# Patient Record
Sex: FEMALE | Race: WHITE | HISPANIC OR LATINO | Employment: OTHER | URBAN - METROPOLITAN AREA
[De-identification: names, ages, dates, MRNs, and addresses within clinical notes are randomized per-mention and may not be internally consistent; named-entity substitution may affect disease eponyms.]

---

## 2020-01-31 ENCOUNTER — TELEPHONE (OUTPATIENT)
Dept: SCHEDULING | Facility: IMAGING CENTER | Age: 55
End: 2020-01-31

## 2020-01-31 ENCOUNTER — OFFICE VISIT (OUTPATIENT)
Dept: MEDICAL GROUP | Facility: PHYSICIAN GROUP | Age: 55
End: 2020-01-31
Payer: COMMERCIAL

## 2020-01-31 VITALS
DIASTOLIC BLOOD PRESSURE: 80 MMHG | BODY MASS INDEX: 33.14 KG/M2 | TEMPERATURE: 99.2 F | WEIGHT: 206.2 LBS | HEIGHT: 66 IN | OXYGEN SATURATION: 96 % | RESPIRATION RATE: 20 BRPM | HEART RATE: 92 BPM | SYSTOLIC BLOOD PRESSURE: 136 MMHG

## 2020-01-31 DIAGNOSIS — Z00.00 ROUTINE HEALTH MAINTENANCE: ICD-10-CM

## 2020-01-31 DIAGNOSIS — G44.209 ACUTE NON INTRACTABLE TENSION-TYPE HEADACHE: Primary | ICD-10-CM

## 2020-01-31 PROBLEM — R51.9 HEADACHE: Status: ACTIVE | Noted: 2020-01-31

## 2020-01-31 PROCEDURE — 99203 OFFICE O/P NEW LOW 30 MIN: CPT | Performed by: FAMILY MEDICINE

## 2020-01-31 SDOH — HEALTH STABILITY: MENTAL HEALTH: HOW OFTEN DO YOU HAVE A DRINK CONTAINING ALCOHOL?: MONTHLY OR LESS

## 2020-01-31 SDOH — HEALTH STABILITY: MENTAL HEALTH: HOW MANY STANDARD DRINKS CONTAINING ALCOHOL DO YOU HAVE ON A TYPICAL DAY?: 1 OR 2

## 2020-01-31 SDOH — HEALTH STABILITY: MENTAL HEALTH: HOW OFTEN DO YOU HAVE 6 OR MORE DRINKS ON ONE OCCASION?: NEVER

## 2020-01-31 ASSESSMENT — PATIENT HEALTH QUESTIONNAIRE - PHQ9: CLINICAL INTERPRETATION OF PHQ2 SCORE: 0

## 2020-01-31 NOTE — LETTER
[facility]  Yarelis Green M.D.  [address]  [place] [address]  Fax: [phone]   Authorization for Release/Disclosure of   Protected Health Information   Name: SHERON ESCOBAR : 1965 SSN: [id number]   Address: [facility] [place] [address] Phone:    [phone] (home)    I authorize the entity listed below to release/disclose the PHI below to:   [facility]/Yarelis Green M.D. and Yarelis Green M.D.   Provider or Entity Name:  Shahid   Address   City, [facility], [facility] Phone:      Fax:     Reason for request: continuity of care   Information to be released:    [  ] LAST COLONOSCOPY,  including any PATH REPORT and follow-up  [  ] LAST FIT/COLOGUARD RESULT [  ] LAST DEXA  [  ] LAST MAMMOGRAM  [  ] LAST PAP  [  ] LAST LABS [  ] RETINA EXAM REPORT  [  ] IMMUNIZATION RECORDS  [xxx ] Release all info      [  ] Check here and initial the line next to each item to release ALL health information INCLUDING  _____ Care and treatment for drug and / or alcohol abuse  _____ HIV testing, infection status, or AIDS  _____ Genetic Testing    DATES OF SERVICE OR TIME PERIOD TO BE DISCLOSED: _____________  I understand and acknowledge that:  * This Authorization may be revoked at any time by you in writing, except if your health information has already been used or disclosed.  * Your health information that will be used or disclosed as a result of you signing this authorization could be re-disclosed by the recipient. If this occurs, your re-disclosed health information may no longer be protected by State or Federal laws.  * You may refuse to sign this Authorization. Your refusal will not affect your ability to obtain treatment.  * This Authorization becomes effective upon signing and will  on (date) __________.      If no date is indicated, this Authorization will  one (1) year from the signature date.    Name: Sheron Escobar    Signature:   Date:     2020            PLEASE FAX REQUESTED RECORDS BACK TO: [phone]

## 2020-01-31 NOTE — ASSESSMENT & PLAN NOTE
This is a new condition.  Onset: last night  Location: bitemporal  Quality: throbbing, squeezing band  Duration: constant, but improved  Severity: 10/10, now 2/10  Associated symptoms: +nausea/vomiting, +photophobia, no phonophobia, no dizziness, no aura  Home treatments: tylenol (vomited first dose), repeated twice    No change in diet, activities.

## 2020-01-31 NOTE — PROGRESS NOTES
Subjective:     CC:  Diagnoses of Acute non intractable tension-type headache and Routine health maintenance were pertinent to this visit.    HISTORY OF THE PRESENT ILLNESS: Patient is a 54 y.o. female. This pleasant patient is here today to establish care and discuss headache. Her prior PCP was with [facility].    Headache  This is a new condition.  Onset: last night  Location: bitemporal  Quality: throbbing, squeezing band  Duration: constant, but improved  Severity: 10/10, now 2/10  Associated symptoms: +nausea/vomiting, +photophobia, no phonophobia, no dizziness, no aura  Home treatments: tylenol (vomited first dose), repeated twice    No change in diet, activities.      Allergies: Aspirin    No current Epic-ordered outpatient medications on file.     No current Epic-ordered facility-administered medications on file.        No past medical history on file.    Past Surgical History:   Procedure Laterality Date   • OVARIAN CYSTECTOMY  1993       Social History     Tobacco Use   • Smoking status: Never Smoker   • Smokeless tobacco: Never Used   Substance Use Topics   • Alcohol use: Yes     Frequency: Monthly or less     Drinks per session: 1 or 2     Binge frequency: Never     Comment: 1-2 drinks per month   • Drug use: Never       Social History     Patient does not qualify to have social determinant information on file (likely too young).   Social History Narrative   • Not on file       Family History   Problem Relation Age of Onset   • Cancer Mother         uterine   • Heart Disease Mother    • Cancer Father         lymphoma   • Diabetes Maternal great-grandmother        Health Maintenance:   Requesting records    ROS:   Gen: no fevers/chills  Eyes: no changes in vision  ENT: + sore throat - vomitied last night  Pulm: no sob  CV: no chest pain  GI: no diarrhea  : no dysuria  MSk: no myalgias  Skin: no rash  Neuro: + numbness/tingling - both hands, 1 month, mainly at night      Objective:     Exam: BP  "136/80 (BP Location: Left arm, Patient Position: Sitting, BP Cuff Size: Adult)   Pulse 92   Temp 37.3 °C (99.2 °F) (Temporal)   Resp 20   Ht 1.676 m (5' 6\")   Wt 93.5 kg (206 lb 3.2 oz)   SpO2 96%  Body mass index is 33.28 kg/m².    General: Normal appearing. No distress.  HEENT: Normocephalic. Eyes conjunctiva clear lids without ptosis, pupils equal and reactive to light accommodation, ears normal shape and contour, canals are clear bilaterally, tympanic membranes are benign, oropharynx is without erythema, edema or exudates.   Neck: Supple without JVD. Thyroid is not enlarged.  Pulmonary: Clear to ausculation.  Normal effort. No rales, ronchi, or wheezing.  Cardiovascular: Regular rate and rhythm without murmur. Carotid and radial pulses are intact and equal bilaterally.  Abdomen: Soft, nontender, nondistended. Normal bowel sounds. Liver and spleen are not palpable  Neurologic: Grossly nonfocal  Lymph: No cervical or supraclavicular lymph nodes are palpable  Skin: Warm and dry.  No obvious lesions.  Musculoskeletal: Normal gait. No extremity cyanosis, clubbing, or edema.  Psych: Normal mood and affect. Alert and oriented x3. Judgment and insight is normal.    Assessment & Plan:   54 y.o. female with the following -    1. Acute non intractable tension-type headache  This is a new condition.  Last night she developed a sudden onset, severe bitemporal headache that was throbbing and in a squeezing band.  It was a 10/10 severity last night and currently is a 2/10.  There was associated nausea and vomiting and photophobia.  No phonophobia, dizziness, or aura.  Since it is improving and it sounds like a tension headache she will continue to use Tylenol or NSAIDs as needed to help with the pain.    2. Routine health maintenance  She is due for yearly labs.  - Comp Metabolic Panel; Future  - Lipid Profile; Future      Return in about 4 weeks (around 2/28/2020) for f/u headache.    Please note that this dictation was " created using voice recognition software. I have made every reasonable attempt to correct obvious errors, but I expect that there are errors of grammar and possibly content that I did not discover before finalizing the note.

## 2020-02-01 ENCOUNTER — HOSPITAL ENCOUNTER (OUTPATIENT)
Dept: LAB | Facility: MEDICAL CENTER | Age: 55
End: 2020-02-01
Attending: FAMILY MEDICINE
Payer: COMMERCIAL

## 2020-02-01 DIAGNOSIS — Z00.00 ROUTINE HEALTH MAINTENANCE: ICD-10-CM

## 2020-02-01 LAB
ALBUMIN SERPL BCP-MCNC: 4.5 G/DL (ref 3.2–4.9)
ALBUMIN/GLOB SERPL: 1.3 G/DL
ALP SERPL-CCNC: 71 U/L (ref 30–99)
ALT SERPL-CCNC: 13 U/L (ref 2–50)
ANION GAP SERPL CALC-SCNC: 10 MMOL/L (ref 0–11.9)
AST SERPL-CCNC: 16 U/L (ref 12–45)
BILIRUB SERPL-MCNC: 0.5 MG/DL (ref 0.1–1.5)
BUN SERPL-MCNC: 14 MG/DL (ref 8–22)
CALCIUM SERPL-MCNC: 10.1 MG/DL (ref 8.5–10.5)
CHLORIDE SERPL-SCNC: 105 MMOL/L (ref 96–112)
CHOLEST SERPL-MCNC: 227 MG/DL (ref 100–199)
CO2 SERPL-SCNC: 26 MMOL/L (ref 20–33)
CREAT SERPL-MCNC: 0.81 MG/DL (ref 0.5–1.4)
GLOBULIN SER CALC-MCNC: 3.5 G/DL (ref 1.9–3.5)
GLUCOSE SERPL-MCNC: 85 MG/DL (ref 65–99)
HDLC SERPL-MCNC: 58 MG/DL
LDLC SERPL CALC-MCNC: 128 MG/DL
POTASSIUM SERPL-SCNC: 4.2 MMOL/L (ref 3.6–5.5)
PROT SERPL-MCNC: 8 G/DL (ref 6–8.2)
SODIUM SERPL-SCNC: 141 MMOL/L (ref 135–145)
TRIGL SERPL-MCNC: 205 MG/DL (ref 0–149)

## 2020-02-01 PROCEDURE — 80061 LIPID PANEL: CPT

## 2020-02-01 PROCEDURE — 80053 COMPREHEN METABOLIC PANEL: CPT

## 2020-02-01 PROCEDURE — 36415 COLL VENOUS BLD VENIPUNCTURE: CPT

## 2020-06-26 ENCOUNTER — OFFICE VISIT (OUTPATIENT)
Dept: MEDICAL GROUP | Facility: PHYSICIAN GROUP | Age: 55
End: 2020-06-26
Payer: COMMERCIAL

## 2020-06-26 VITALS
HEART RATE: 74 BPM | OXYGEN SATURATION: 96 % | HEIGHT: 66 IN | SYSTOLIC BLOOD PRESSURE: 106 MMHG | TEMPERATURE: 97.3 F | DIASTOLIC BLOOD PRESSURE: 68 MMHG | RESPIRATION RATE: 12 BRPM | WEIGHT: 211.4 LBS | BODY MASS INDEX: 33.97 KG/M2

## 2020-06-26 DIAGNOSIS — R52 TENDERNESS: ICD-10-CM

## 2020-06-26 PROCEDURE — 99214 OFFICE O/P EST MOD 30 MIN: CPT | Performed by: FAMILY MEDICINE

## 2020-06-26 NOTE — ASSESSMENT & PLAN NOTE
This is a new condition.  Onset: 1 month  Location: right axilla - inferior aspect  Duration: intermittent - but more frequent recently  Quality: tenderness - dull ache  Modifying factors: no triggers, worse with direct palpation  Precipitating trauma: none  Associated symptoms: no lumps, no pleuritic pain  Home treatments: none    She reports mammogram in 2/2019 was normal.

## 2020-06-26 NOTE — PROGRESS NOTES
"Subjective:     CC: tenderness in right axilla    HPI:   Sheron presents today with     Tenderness  This is a new condition.  Onset: 1 month  Location: right axilla - inferior aspect  Duration: intermittent - but more frequent recently  Quality: tenderness - dull ache  Modifying factors: no triggers, worse with direct palpation  Precipitating trauma: none  Associated symptoms: no lumps, no pleuritic pain  Home treatments: none    She reports mammogram in 2/2019 was normal.         No past medical history on file.    Social History     Tobacco Use   • Smoking status: Never Smoker   • Smokeless tobacco: Never Used   Substance Use Topics   • Alcohol use: Yes     Frequency: Monthly or less     Drinks per session: 1 or 2     Binge frequency: Never     Comment: 1-2 drinks per month   • Drug use: Never       Current Outpatient Medications Ordered in Epic   Medication Sig Dispense Refill   • albuterol 108 (90 Base) MCG/ACT Aero Soln inhalation aerosol Inhale 2 Puffs by mouth every four hours as needed for Shortness of Breath. 1 Inhaler 2     No current Epic-ordered facility-administered medications on file.        Allergies:  Aspirin    Health Maintenance: Requesting records    ROS:  Gen: no fevers/chills  Pulm: no sob  CV: no chest pain    Objective:     Exam:  /68 (BP Location: Right arm, Patient Position: Sitting, BP Cuff Size: Large adult)   Pulse 74   Temp 36.3 °C (97.3 °F) (Temporal)   Resp 12   Ht 1.676 m (5' 6\")   Wt 95.9 kg (211 lb 6.4 oz)   SpO2 96%   BMI 34.12 kg/m²  Body mass index is 34.12 kg/m².    Gen: Alert and oriented, No apparent distress.  Neck: Neck is supple without lymphadenopathy.  Lungs: Normal effort, CTA bilaterally, no wheezes, rhonchi, or rales  CV: Regular rate and rhythm. No murmurs, rubs, or gallops.  Ext: No clubbing, cyanosis, edema. +TTP just inferior to right axilla. No lumps or pain with nearby muscle activation.    Assessment & Plan:     55 y.o. female with the following - "     1. Tenderness  This is a new condition.  For last month she has had intermittent pain in the inferior aspect of her right axilla.  Has been occurring more frequent recently.  It is a dull ache and there is no triggers she can isolate but it does feel worse when she directly presses on the location.  No precipitating trauma she can recall.  There is no affiliated lungs, pleuritic pain, breast lump, nipple discharge.  She did have her last mammogram in February, 2019 which she reports was normal.  On exam she is tender to palpation just inferior to the right axilla.  I do not palpate any lumps on exam and I suspect it is medius muscle strain.  However, she is worried and would like to get an ultrasound to evaluate the area further, which has been ordered.  - US-ABDOMEN LTD (SOFT TISSUE); Future  -Conservative management discussed    Return if symptoms worsen or fail to improve.    Please note that this dictation was created using voice recognition software. I have made every reasonable attempt to correct obvious errors, but I expect that there are errors of grammar and possibly content that I did not discover before finalizing the note.

## 2020-06-27 ENCOUNTER — HOSPITAL ENCOUNTER (OUTPATIENT)
Dept: RADIOLOGY | Facility: MEDICAL CENTER | Age: 55
End: 2020-06-27
Attending: FAMILY MEDICINE
Payer: COMMERCIAL

## 2020-06-27 DIAGNOSIS — R52 TENDERNESS: ICD-10-CM

## 2020-06-27 PROCEDURE — 76604 US EXAM CHEST: CPT

## 2020-12-01 ENCOUNTER — HOSPITAL ENCOUNTER (OUTPATIENT)
Facility: MEDICAL CENTER | Age: 55
End: 2020-12-01
Attending: NURSE PRACTITIONER
Payer: COMMERCIAL

## 2020-12-01 ENCOUNTER — OFFICE VISIT (OUTPATIENT)
Dept: URGENT CARE | Facility: PHYSICIAN GROUP | Age: 55
End: 2020-12-01
Payer: COMMERCIAL

## 2020-12-01 ENCOUNTER — HOSPITAL ENCOUNTER (OUTPATIENT)
Dept: RADIOLOGY | Facility: MEDICAL CENTER | Age: 55
End: 2020-12-01
Attending: NURSE PRACTITIONER
Payer: COMMERCIAL

## 2020-12-01 VITALS
HEART RATE: 109 BPM | TEMPERATURE: 101 F | RESPIRATION RATE: 16 BRPM | SYSTOLIC BLOOD PRESSURE: 128 MMHG | OXYGEN SATURATION: 95 % | BODY MASS INDEX: 34.07 KG/M2 | DIASTOLIC BLOOD PRESSURE: 70 MMHG | WEIGHT: 212 LBS | HEIGHT: 66 IN

## 2020-12-01 DIAGNOSIS — R50.9 FEVER, UNSPECIFIED FEVER CAUSE: ICD-10-CM

## 2020-12-01 DIAGNOSIS — Z20.822 SUSPECTED COVID-19 VIRUS INFECTION: ICD-10-CM

## 2020-12-01 DIAGNOSIS — J18.9 PNEUMONIA OF RIGHT UPPER LOBE DUE TO INFECTIOUS ORGANISM: ICD-10-CM

## 2020-12-01 DIAGNOSIS — J18.9 PNEUMONIA OF BOTH LOWER LOBES DUE TO INFECTIOUS ORGANISM: ICD-10-CM

## 2020-12-01 PROCEDURE — 71046 X-RAY EXAM CHEST 2 VIEWS: CPT

## 2020-12-01 PROCEDURE — 99214 OFFICE O/P EST MOD 30 MIN: CPT | Mod: CS | Performed by: NURSE PRACTITIONER

## 2020-12-01 PROCEDURE — U0003 INFECTIOUS AGENT DETECTION BY NUCLEIC ACID (DNA OR RNA); SEVERE ACUTE RESPIRATORY SYNDROME CORONAVIRUS 2 (SARS-COV-2) (CORONAVIRUS DISEASE [COVID-19]), AMPLIFIED PROBE TECHNIQUE, MAKING USE OF HIGH THROUGHPUT TECHNOLOGIES AS DESCRIBED BY CMS-2020-01-R: HCPCS

## 2020-12-01 RX ORDER — ACETAMINOPHEN 500 MG
500 TABLET ORAL ONCE
Status: COMPLETED | OUTPATIENT
Start: 2020-12-01 | End: 2020-12-01

## 2020-12-01 RX ORDER — DEXAMETHASONE 6 MG/1
6 TABLET ORAL DAILY
Qty: 5 TAB | Refills: 0 | Status: SHIPPED | OUTPATIENT
Start: 2020-12-01 | End: 2020-12-06

## 2020-12-01 RX ORDER — DOXYCYCLINE HYCLATE 100 MG
100 TABLET ORAL 2 TIMES DAILY
Qty: 14 TAB | Refills: 0 | Status: SHIPPED | OUTPATIENT
Start: 2020-12-01 | End: 2020-12-08

## 2020-12-01 RX ADMIN — Medication 500 MG: at 11:28

## 2020-12-01 ASSESSMENT — ENCOUNTER SYMPTOMS
WEAKNESS: 1
ABDOMINAL PAIN: 0
SORE THROAT: 0
RHINORRHEA: 1
NAUSEA: 0
EYE REDNESS: 0
FEVER: 1
SHORTNESS OF BREATH: 0
WHEEZING: 1
MYALGIAS: 1
DIZZINESS: 0
HEMOPTYSIS: 0
HEADACHES: 1
EYE DISCHARGE: 0
SINUS PRESSURE: 0
COUGH: 1
SPUTUM PRODUCTION: 0

## 2020-12-01 NOTE — PROGRESS NOTES
Subjective:     Sheron Le is a 55 y.o. female who presents for Headache (chills, fever, fatigue, body aches, wheezing,cough, congestion x10)      Headache   Associated symptoms include coughing, a fever, muscle aches, rhinorrhea and weakness. Pertinent negatives include no abdominal pain, dizziness, ear pain, eye redness, nausea, sinus pressure or sore throat. Associated symptoms comments: diarrhea. She has tried acetaminophen for the symptoms.     Pt presents for evaluation of a new problem. Sheron comes to the clinic today with complaints of illness X 10 days. Her symptoms started out with congestion/productive cough  for 4 days and then progressively worsened to chills, back pain, fevers (started 3 days ago), severe fatigue. She does have a history of asthma. She has not felt the need to use her albuterol inhaler. She has used Tylenol and heat pads for her symptoms. This has provided mild relief. She denies any known ill contacts.     Review of Systems   Constitutional: Positive for fever and malaise/fatigue.   HENT: Positive for congestion and rhinorrhea. Negative for ear pain, sinus pressure and sore throat.    Eyes: Negative for discharge and redness.   Respiratory: Positive for cough and wheezing. Negative for hemoptysis, sputum production and shortness of breath.    Gastrointestinal: Negative for abdominal pain and nausea.   Musculoskeletal: Positive for myalgias.   Skin: Negative for rash.   Neurological: Positive for weakness and headaches. Negative for dizziness.   Endo/Heme/Allergies: Negative for environmental allergies.   All other systems reviewed and are negative.      PMH: No past medical history on file.  ALLERGIES:   Allergies   Allergen Reactions   • Aspirin Swelling     Face swelling     SURGHX:   Past Surgical History:   Procedure Laterality Date   • OVARIAN CYSTECTOMY  1993     SOCHX:   Social History     Socioeconomic History   • Marital status:      Spouse name: Not on file   •  "Number of children: Not on file   • Years of education: Not on file   • Highest education level: Not on file   Occupational History   • Not on file   Social Needs   • Financial resource strain: Not on file   • Food insecurity     Worry: Not on file     Inability: Not on file   • Transportation needs     Medical: Not on file     Non-medical: Not on file   Tobacco Use   • Smoking status: Never Smoker   • Smokeless tobacco: Never Used   Substance and Sexual Activity   • Alcohol use: Yes     Frequency: Monthly or less     Drinks per session: 1 or 2     Binge frequency: Never     Comment: 1-2 drinks per month   • Drug use: Never   • Sexual activity: Yes     Partners: Male     Birth control/protection: Post-Menopausal, None   Lifestyle   • Physical activity     Days per week: Not on file     Minutes per session: Not on file   • Stress: Not on file   Relationships   • Social connections     Talks on phone: Not on file     Gets together: Not on file     Attends Cheondo[employer] service: Not on file     Active member of club or organization: Not on file     Attends meetings of clubs or organizations: Not on file     Relationship status: Not on file   • Intimate partner violence     Fear of current or ex partner: Not on file     Emotionally abused: Not on file     Physically abused: Not on file     Forced sexual activity: Not on file   Other Topics Concern   • Not on file   Social History Narrative   • Not on file     FH:   Family History   Problem Relation Age of Onset   • Cancer Mother         uterine   • Heart Disease Mother    • Cancer Father         lymphoma   • Diabetes Maternal great-grandmother          Objective:   /70 (BP Location: Right arm, Patient Position: Sitting, BP Cuff Size: Adult)   Pulse (!) 109   Temp (!) 38.3 °C (101 °F) (Temporal)   Resp 16   Ht 1.676 m (5' 6\")   Wt 96.2 kg (212 lb)   SpO2 95%   BMI 34.22 kg/m²     Physical Exam  Vitals signs and nursing note reviewed.   Constitutional:       " General: She is not in acute distress.     Appearance: Normal appearance. She is normal weight. She is ill-appearing. She is not toxic-appearing.   HENT:      Head: Normocephalic and atraumatic.      Right Ear: Tympanic membrane, ear canal and external ear normal. There is no impacted cerumen.      Left Ear: Tympanic membrane, ear canal and external ear normal. There is no impacted cerumen.      Nose: Nose normal. No congestion or rhinorrhea.      Mouth/Throat:      Mouth: Mucous membranes are moist.      Pharynx: No oropharyngeal exudate or posterior oropharyngeal erythema.   Eyes:      General:         Right eye: No discharge.         Left eye: No discharge.      Extraocular Movements: Extraocular movements intact.      Pupils: Pupils are equal, round, and reactive to light.   Neck:      Musculoskeletal: Normal range of motion and neck supple. Muscular tenderness present. No neck rigidity.   Cardiovascular:      Rate and Rhythm: Regular rhythm. Tachycardia present.      Heart sounds: Normal heart sounds. No murmur.   Pulmonary:      Effort: Pulmonary effort is normal. No respiratory distress.      Breath sounds: Normal breath sounds. No stridor. No wheezing, rhonchi or rales.      Comments: Breathing is labored  Chest:      Chest wall: No tenderness.   Abdominal:      General: Abdomen is flat. Bowel sounds are normal. There is no distension.      Palpations: Abdomen is soft.      Tenderness: There is no abdominal tenderness. There is no right CVA tenderness, left CVA tenderness or guarding.   Musculoskeletal: Normal range of motion.   Lymphadenopathy:      Cervical: Cervical adenopathy present.   Skin:     General: Skin is warm and dry.      Capillary Refill: Capillary refill takes less than 2 seconds.   Neurological:      General: No focal deficit present.      Mental Status: She is alert and oriented to person, place, and time. Mental status is at baseline.   Psychiatric:         Mood and Affect: Mood normal.          Behavior: Behavior normal.         Thought Content: Thought content normal.         Judgment: Judgment normal.       DX chest: IMPRESSION:     1.  Multifocal peripheral airspace process consistent with pneumonia and most consistent with Covid pneumonia  Assessment/Plan:   Assessment    1. Fever, unspecified fever cause  acetaminophen (TYLENOL) tablet 500 mg    DX-CHEST-2 VIEWS   2. Suspected COVID-19 virus infection  DX-CHEST-2 VIEWS    COVID/SARS COV-2 PCR   CXR reviewed by myself and I agree with radiologist interpretation.   Pt was tested for COVID today. It is possible her test will be negative as she is 10 days into her illness.  I will call with results. Upon entering the room PPE was worn throughout the duration of his visit for both provider and patient. Mask of patient briefly removed for examination of oropharynx. PT was educated to remain in self isolation for at least 10 days following the onset of symptoms and to not return to work until symptoms have resolved for three days without the use of symptom altering medication.     We discussed supportive measures including humidifier, warm salt water gargles, over-the-counter Cepacol throat lozenges, rest  and increased fluids. Pt was encouraged to seek treatment back in the ER or urgent care for worsening symptoms,  fever greater than 100.5, wheezes or shortness of breath.    AVS handout given and reviewed with patient. Pt educated on red flags and when to seek treatment back in ER or UC.

## 2020-12-02 DIAGNOSIS — Z20.822 SUSPECTED COVID-19 VIRUS INFECTION: ICD-10-CM

## 2020-12-02 LAB — COVID ORDER STATUS COVID19: NORMAL

## 2020-12-03 LAB
SARS-COV-2 RNA RESP QL NAA+PROBE: DETECTED
SPECIMEN SOURCE: ABNORMAL

## 2020-12-04 ENCOUNTER — TELEPHONE (OUTPATIENT)
Dept: URGENT CARE | Facility: PHYSICIAN GROUP | Age: 55
End: 2020-12-04

## 2020-12-11 ENCOUNTER — APPOINTMENT (OUTPATIENT)
Dept: RADIOLOGY | Facility: IMAGING CENTER | Age: 55
End: 2020-12-11
Attending: NURSE PRACTITIONER
Payer: COMMERCIAL

## 2020-12-11 DIAGNOSIS — J12.82 PNEUMONIA DUE TO COVID-19 VIRUS: ICD-10-CM

## 2020-12-11 DIAGNOSIS — U07.1 PNEUMONIA DUE TO COVID-19 VIRUS: ICD-10-CM

## 2020-12-12 ENCOUNTER — APPOINTMENT (OUTPATIENT)
Dept: RADIOLOGY | Facility: IMAGING CENTER | Age: 55
End: 2020-12-12
Attending: NURSE PRACTITIONER
Payer: COMMERCIAL

## 2020-12-18 ENCOUNTER — NURSE TRIAGE (OUTPATIENT)
Dept: HEALTH INFORMATION MANAGEMENT | Facility: OTHER | Age: 55
End: 2020-12-18

## 2020-12-18 NOTE — TELEPHONE ENCOUNTER
Pt calls with questions regarding Covid and contagiousness. Pt and spouse COVID +, 12/03/2020 with symptom onset of earlier date. Pt has had resolution of symptoms. States spouse still using nebulizer for cough (spouse is a smoker). Provided with CDC guidelines for isolation, symptoms. All questions answered.       1. Caller Name: Sheron Le                 Call Back Number:[phone]  [facility] PCP or Specialty Provider: Yes Yarelis Green        2.  Has the patient previously tested positive for COVID-19? Yes, 12/03/2020    Was the patient hospitalized due to COVID-19 or are they being scheduled for PFT? No         Has it been at least 14 days since date of symptom onset or positive test? Yes    Disposition: Confirmed patient appointment. Advised patient to call [phone] if anything changes prior to scheduled appointment.    Pt not interested in scheduling appointment at this time. Has virtual [facility] on 12/23/2020 with PCP. Just had general questions about Covid and infectious time frame.    Reason for Disposition  • Information only question and nurse able to answer  • COVID-19, questions about    Additional Information  • Negative: Nursing judgment  • Negative: Nursing judgment  • Negative: Nursing judgment  • Negative: Nursing judgment  • Negative: MILD difficulty breathing (e.g., minimal/no SOB at rest, SOB with walking, pulse <100)  • Negative: Chest pain  • Negative: Patient sounds very sick or weak to the triager  • Negative: Fever > 103 F (39.4 C)  • Negative: [1] Fever > 101 F (38.3 C) AND [2] age > 60  • Negative: [1] Fever > 100.0 F (37.8 C) AND [2] bedridden (e.g., nursing home patient, CVA, chronic illness, recovering from surgery)  • Negative: HIGH RISK patient (e.g., age > 64 years, diabetes, heart or lung disease, weak immune system)  • Negative: Fever present > 3 days (72 hours)  • Negative: [1] Fever returns after gone for over 24 hours AND [2] symptoms worse or not improved  • Negative: [1]  Continuous (nonstop) coughing interferes with work or school AND [2] no improvement using cough treatment per protocol  • Negative: Cough present > 3 weeks    Protocols used: INFORMATION ONLY CALL - NO TRIAGE-A-OH, CORONAVIRUS (COVID-19) DIAGNOSED OR DECPTLNUS-D-ES

## 2021-01-04 ENCOUNTER — HOSPITAL ENCOUNTER (OUTPATIENT)
Dept: RADIOLOGY | Facility: MEDICAL CENTER | Age: 56
End: 2021-01-04
Attending: NURSE PRACTITIONER
Payer: COMMERCIAL

## 2021-01-04 PROCEDURE — 71046 X-RAY EXAM CHEST 2 VIEWS: CPT

## 2021-02-23 ENCOUNTER — TELEMEDICINE (OUTPATIENT)
Dept: MEDICAL GROUP | Facility: PHYSICIAN GROUP | Age: 56
End: 2021-02-23

## 2021-02-23 VITALS — HEIGHT: 66 IN | RESPIRATION RATE: 12 BRPM | BODY MASS INDEX: 29.73 KG/M2 | WEIGHT: 185 LBS

## 2021-02-23 DIAGNOSIS — Z13.0 SCREENING FOR DEFICIENCY ANEMIA: ICD-10-CM

## 2021-02-23 DIAGNOSIS — Z12.12 SCREENING FOR COLORECTAL CANCER: ICD-10-CM

## 2021-02-23 DIAGNOSIS — R23.2 HOT FLASHES: ICD-10-CM

## 2021-02-23 DIAGNOSIS — Z12.11 SCREENING FOR COLORECTAL CANCER: ICD-10-CM

## 2021-02-23 DIAGNOSIS — Z13.29 SCREENING FOR ENDOCRINE DISORDER: ICD-10-CM

## 2021-02-23 DIAGNOSIS — Z13.6 SCREENING FOR CARDIOVASCULAR CONDITION: ICD-10-CM

## 2021-02-23 DIAGNOSIS — N95.1 MENOPAUSAL STATE: ICD-10-CM

## 2021-02-23 DIAGNOSIS — J45.20 MILD INTERMITTENT ASTHMA WITHOUT COMPLICATION: ICD-10-CM

## 2021-02-23 DIAGNOSIS — Z23 NEED FOR VACCINATION: ICD-10-CM

## 2021-02-23 PROCEDURE — 99213 OFFICE O/P EST LOW 20 MIN: CPT | Performed by: PHYSICIAN ASSISTANT

## 2021-02-23 ASSESSMENT — PATIENT HEALTH QUESTIONNAIRE - PHQ9: CLINICAL INTERPRETATION OF PHQ2 SCORE: 0

## 2021-02-23 NOTE — ASSESSMENT & PLAN NOTE
Patient's last menstrual cycle was about 2 years ago. Had hot flashes initially and then went away. Started to come back about 4 months ago. Day and night. Starts into chest and goes up neck. Last 10 minutes or so then resolve. She's getting about 2-3 a day. Not inhibiting her day to day activities.

## 2021-02-23 NOTE — ASSESSMENT & PLAN NOTE
Very rare flare ups. Usually uses when she's sick. In November she had COVID and secondary pneumonia. Feeling better now.     Sometimes outdoor hiking activities flare it up. Uses maybe 5 times a year.

## 2021-02-23 NOTE — PROGRESS NOTES
Virtual Visit: Established Patient   This visit was conducted via Zoom using secure and encrypted videoconferencing technology. The patient was in a private location in the state of [place].    The patient's identity was confirmed and verbal consent was obtained for this virtual visit.    Subjective:   CC:   Chief Complaint   Patient presents with   • Establish Care   • Requesting Labs   • Hot Flashes     x 4 months       Sheron Le is a 55 y.o. female presenting for evaluation and management of:    Health Maintenance: Completed  Pap 2019- normal - per patient, done at [facility].   Mammogram 2019- normal at [facility] per patient.  Cologuard pended.    Chickenpox as kid.   Had flu shot at [facility].     Menopausal state  Patient's last menstrual cycle was about 2 years ago. Had hot flashes initially and then went away. Started to come back about 4 months ago. Day and night. Starts into chest and goes up neck. Last 10 minutes or so then resolve. She's getting about 2-3 a day. Not inhibiting her day to day activities.     Mild intermittent asthma without complication  Very rare flare ups. Usually uses when she's sick. In November she had COVID and secondary pneumonia. Feeling better now.     Sometimes outdoor hiking activities flare it up. Uses maybe 5 times a year.       ROS   Denies any recent fevers or chills. No nausea or vomiting. No chest pains or shortness of breath.     Allergies   Allergen Reactions   • Aspirin Swelling     Face swelling       Current medicines (including changes today)  Current Outpatient Medications   Medication Sig Dispense Refill   • Zoster Vac Recomb Adjuvanted (SHINGRIX) 50 MCG/0.5ML Recon Susp Inject 0.5 mL into the shoulder, thigh, or buttocks one time for 1 dose. 0.5 mL 0   • albuterol 108 (90 Base) MCG/ACT Aero Soln inhalation aerosol Inhale 2 Puffs by mouth every four hours as needed for Shortness of Breath. 1 Inhaler 2     No current facility-administered medications for this visit.  "      Patient Active Problem List    Diagnosis Date Noted   • Menopausal state 02/23/2021   • Mild intermittent asthma without complication 02/23/2021   • Tenderness 06/26/2020   • Headache 01/31/2020       Family History   Problem Relation Age of Onset   • Cancer Mother         uterine   • Heart Disease Mother    • Cancer Father         lymphoma   • Diabetes Maternal great-grandmother        She  has no past medical history on file.  She  has a past surgical history that includes ovarian cystectomy (1993).       Objective:   Resp 12   Ht 1.676 m (5' 6\") Comment: per pt  Wt 83.9 kg (185 lb) Comment: per pt  BMI 29.86 kg/m²     Physical Exam:  Constitutional: Alert, no distress, well-groomed.  Skin: No rashes in visible areas.  Eye: Round. Conjunctiva clear, lids normal. No icterus.   ENMT: Lips pink without lesions, good dentition, moist mucous membranes. Phonation normal.  Neck: No masses, no thyromegaly. Moves freely without pain.  Respiratory: Unlabored respiratory effort, no cough or audible wheeze  Psych: Alert and oriented x3, normal affect and mood.       Assessment and Plan:   The following treatment plan was discussed:     1. Menopausal state  2. Hot flashes  - ESTRADIOL; Future  - PROGESTERONE; Future  - FSH/LH; Future  Patient is having menopausal hot flashes.  Not interfering with her day-to-day activities at this time, having about 2 to 3-day lasting 10 minutes.  She would like to get her hormones checked.  Discussed with patient this would not necessarily change her treatment plan.  She does not want to start any hormone replacement therapy or alternative medications at this time.  We will watch her symptoms closely.  Discussed could refer to gynecology if needed as well.    3. Mild intermittent asthma without complication  Chronic intermittent condition.  Usually uses about 5 times a year.  Has rescue inhaler on hand.    4. Need for vaccination  - Zoster Vac Recomb Adjuvanted (SHINGRIX) 50 " MCG/0.5ML Recon Susp; Inject 0.5 mL into the shoulder, thigh, or buttocks one time for 1 dose.  Dispense: 0.5 mL; Refill: 0    5. Screening for deficiency anemia  - CBC WITH DIFFERENTIAL; Future    6. Screening for cardiovascular condition  - Comp Metabolic Panel; Future  - Lipid Profile; Future    7. Screening for endocrine disorder  - TSH; Future  - VITAMIN D,25 HYDROXY; Future    8. Screening for colorectal cancer  - COLOGUARD (FIT DNA)        Follow-up: Return in about 4 weeks (around 3/23/2021) for Follow up on labs.        The patient verbalized agreement and understanding of current plan. All questions and concerns were addressed at time of visit.    Please note that this dictation was created using voice recognition software. I have made every reasonable attempt to correct obvious errors, but I expect that there are errors of grammar and possibly content that I did not discover before finalizing the note.

## 2021-03-02 ENCOUNTER — HOSPITAL ENCOUNTER (OUTPATIENT)
Dept: LAB | Facility: MEDICAL CENTER | Age: 56
End: 2021-03-02
Attending: PHYSICIAN ASSISTANT
Payer: COMMERCIAL

## 2021-03-02 DIAGNOSIS — Z13.0 SCREENING FOR DEFICIENCY ANEMIA: ICD-10-CM

## 2021-03-02 DIAGNOSIS — Z13.6 SCREENING FOR CARDIOVASCULAR CONDITION: ICD-10-CM

## 2021-03-02 DIAGNOSIS — R23.2 HOT FLASHES: ICD-10-CM

## 2021-03-02 DIAGNOSIS — Z13.29 SCREENING FOR ENDOCRINE DISORDER: ICD-10-CM

## 2021-03-02 LAB
25(OH)D3 SERPL-MCNC: 40 NG/ML (ref 30–100)
ALBUMIN SERPL BCP-MCNC: 4.2 G/DL (ref 3.2–4.9)
ALBUMIN/GLOB SERPL: 1.4 G/DL
ALP SERPL-CCNC: 79 U/L (ref 30–99)
ALT SERPL-CCNC: 27 U/L (ref 2–50)
ANION GAP SERPL CALC-SCNC: 9 MMOL/L (ref 7–16)
AST SERPL-CCNC: 31 U/L (ref 12–45)
BASOPHILS # BLD AUTO: 0.8 % (ref 0–1.8)
BASOPHILS # BLD: 0.03 K/UL (ref 0–0.12)
BILIRUB SERPL-MCNC: 0.4 MG/DL (ref 0.1–1.5)
BUN SERPL-MCNC: 9 MG/DL (ref 8–22)
CALCIUM SERPL-MCNC: 10.2 MG/DL (ref 8.5–10.5)
CHLORIDE SERPL-SCNC: 103 MMOL/L (ref 96–112)
CHOLEST SERPL-MCNC: 235 MG/DL (ref 100–199)
CO2 SERPL-SCNC: 26 MMOL/L (ref 20–33)
CREAT SERPL-MCNC: 0.6 MG/DL (ref 0.5–1.4)
EOSINOPHIL # BLD AUTO: 0.13 K/UL (ref 0–0.51)
EOSINOPHIL NFR BLD: 3.4 % (ref 0–6.9)
ERYTHROCYTE [DISTWIDTH] IN BLOOD BY AUTOMATED COUNT: 43 FL (ref 35.9–50)
ESTRADIOL SERPL-MCNC: 9 PG/ML
FASTING STATUS PATIENT QL REPORTED: NORMAL
FSH SERPL-ACNC: 73.3 MIU/ML
GLOBULIN SER CALC-MCNC: 3.1 G/DL (ref 1.9–3.5)
GLUCOSE SERPL-MCNC: 86 MG/DL (ref 65–99)
HCT VFR BLD AUTO: 41 % (ref 37–47)
HDLC SERPL-MCNC: 56 MG/DL
HGB BLD-MCNC: 13.7 G/DL (ref 12–16)
IMM GRANULOCYTES # BLD AUTO: 0.01 K/UL (ref 0–0.11)
IMM GRANULOCYTES NFR BLD AUTO: 0.3 % (ref 0–0.9)
LDLC SERPL CALC-MCNC: 133 MG/DL
LH SERPL-ACNC: 44.6 IU/L
LYMPHOCYTES # BLD AUTO: 1.11 K/UL (ref 1–4.8)
LYMPHOCYTES NFR BLD: 28.6 % (ref 22–41)
MCH RBC QN AUTO: 30.6 PG (ref 27–33)
MCHC RBC AUTO-ENTMCNC: 33.4 G/DL (ref 33.6–35)
MCV RBC AUTO: 91.7 FL (ref 81.4–97.8)
MONOCYTES # BLD AUTO: 0.38 K/UL (ref 0–0.85)
MONOCYTES NFR BLD AUTO: 9.8 % (ref 0–13.4)
NEUTROPHILS # BLD AUTO: 2.22 K/UL (ref 2–7.15)
NEUTROPHILS NFR BLD: 57.1 % (ref 44–72)
NRBC # BLD AUTO: 0 K/UL
NRBC BLD-RTO: 0 /100 WBC
PLATELET # BLD AUTO: 245 K/UL (ref 164–446)
PMV BLD AUTO: 10.7 FL (ref 9–12.9)
POTASSIUM SERPL-SCNC: 4.2 MMOL/L (ref 3.6–5.5)
PROT SERPL-MCNC: 7.3 G/DL (ref 6–8.2)
RBC # BLD AUTO: 4.47 M/UL (ref 4.2–5.4)
SODIUM SERPL-SCNC: 138 MMOL/L (ref 135–145)
TRIGL SERPL-MCNC: 228 MG/DL (ref 0–149)
TSH SERPL DL<=0.005 MIU/L-ACNC: 0.31 UIU/ML (ref 0.38–5.33)
WBC # BLD AUTO: 3.9 K/UL (ref 4.8–10.8)

## 2021-03-02 PROCEDURE — 85025 COMPLETE CBC W/AUTO DIFF WBC: CPT

## 2021-03-02 PROCEDURE — 80061 LIPID PANEL: CPT

## 2021-03-02 PROCEDURE — 82670 ASSAY OF TOTAL ESTRADIOL: CPT

## 2021-03-02 PROCEDURE — 36415 COLL VENOUS BLD VENIPUNCTURE: CPT

## 2021-03-02 PROCEDURE — 84144 ASSAY OF PROGESTERONE: CPT

## 2021-03-02 PROCEDURE — 82306 VITAMIN D 25 HYDROXY: CPT

## 2021-03-02 PROCEDURE — 83002 ASSAY OF GONADOTROPIN (LH): CPT

## 2021-03-02 PROCEDURE — 80053 COMPREHEN METABOLIC PANEL: CPT

## 2021-03-02 PROCEDURE — 83001 ASSAY OF GONADOTROPIN (FSH): CPT

## 2021-03-02 PROCEDURE — 84443 ASSAY THYROID STIM HORMONE: CPT

## 2021-03-03 ENCOUNTER — TELEPHONE (OUTPATIENT)
Dept: MEDICAL GROUP | Facility: PHYSICIAN GROUP | Age: 56
End: 2021-03-03

## 2021-03-03 LAB — PROGEST SERPL-MCNC: 0.06 NG/ML

## 2021-03-03 NOTE — TELEPHONE ENCOUNTER
----- Message from Amy Crowe P.A.-C. sent at 3/3/2021 12:14 PM PST -----  Please call patient about their labs.     There are a few things on their labs I'd like to talk about. Please schedule a follow up.     Thank you,    Amy Crowe P.A.-C.

## 2021-03-05 PROBLEM — D72.819 WBC DECREASED: Status: ACTIVE | Noted: 2021-03-05

## 2021-03-05 PROBLEM — R79.89 ELEVATED TSH: Status: ACTIVE | Noted: 2021-03-05

## 2021-03-05 PROBLEM — E78.5 DYSLIPIDEMIA: Status: ACTIVE | Noted: 2021-03-05

## 2021-03-05 NOTE — ASSESSMENT & PLAN NOTE
Patient requested for female hormones.  Her FSH is elevated at 73 indicating that she most likely is.  LH is within normal limits at 44.6 again appropriate for postmenopausal range.  Estradiol is not 9.0, progesterone at 0.06, this is quite low.  Reviewed results in detail with the patient today.

## 2021-03-05 NOTE — ASSESSMENT & PLAN NOTE
TSH incidentally found to be low at 0.310.    Family history of hyperthyroidism? No FH of this.   Patient denies any palpitations, excessive sweating (she is hot flashes), diarrhea, or unintentional weight loss.

## 2021-03-05 NOTE — ASSESSMENT & PLAN NOTE
Lab Results   Component Value Date/Time    WBC 3.9 (L) 03/02/2021 08:32 AM    RBC 4.47 03/02/2021 08:32 AM    HEMOGLOBIN 13.7 03/02/2021 08:32 AM    HEMATOCRIT 41.0 03/02/2021 08:32 AM    MCV 91.7 03/02/2021 08:32 AM    MCH 30.6 03/02/2021 08:32 AM    MCHC 33.4 (L) 03/02/2021 08:32 AM    RDW 43.0 03/02/2021 08:32 AM    PLATELETCT 245 03/02/2021 08:32 AM    MPV 10.7 03/02/2021 08:32 AM    NEUTSPOLYS 57.10 03/02/2021 08:32 AM    LYMPHOCYTES 28.60 03/02/2021 08:32 AM    MONOCYTES 9.80 03/02/2021 08:32 AM    EOSINOPHILS 3.40 03/02/2021 08:32 AM    BASOPHILS 0.80 03/02/2021 08:32 AM    IMMGRAN 0.30 03/02/2021 08:32 AM    NRBC 0.00 03/02/2021 08:32 AM    NEUTS 2.22 03/02/2021 08:32 AM    LYMPHS 1.11 03/02/2021 08:32 AM    MONOS 0.38 03/02/2021 08:32 AM    EOS 0.13 03/02/2021 08:32 AM    BASO 0.03 03/02/2021 08:32 AM    IMMGRANAB 0.01 03/02/2021 08:32 AM    NRBCAB 0.00 03/02/2021 08:32 AM   ]  White blood cell count just slightly low, recommend repeating labs.

## 2021-03-05 NOTE — ASSESSMENT & PLAN NOTE
This is a chronic condition.   Latest Labs:   Lab Results   Component Value Date/Time    CHOLSTRLTOT 235 (H) 03/02/2021 08:32 AM     (H) 03/02/2021 08:32 AM    HDL 56 03/02/2021 08:32 AM    TRIGLYCERIDE 228 (H) 03/02/2021 08:32 AM      Medications: none currently.  Discussed medication management versus lifestyle modifications.    Risk calculator: The 10-year ASCVD risk score (Markroyer NGUYEN Jr., et al., 2013) is: 2.1%     Patient has started exercising more after she her labs. She has also reduced pastries and bread.

## 2021-03-08 ENCOUNTER — TELEMEDICINE (OUTPATIENT)
Dept: MEDICAL GROUP | Facility: PHYSICIAN GROUP | Age: 56
End: 2021-03-08
Payer: COMMERCIAL

## 2021-03-08 VITALS — WEIGHT: 185 LBS | BODY MASS INDEX: 29.73 KG/M2 | RESPIRATION RATE: 12 BRPM | HEIGHT: 66 IN

## 2021-03-08 DIAGNOSIS — R79.89 LOW TSH LEVEL: ICD-10-CM

## 2021-03-08 DIAGNOSIS — N95.1 MENOPAUSAL STATE: ICD-10-CM

## 2021-03-08 DIAGNOSIS — E78.5 DYSLIPIDEMIA: ICD-10-CM

## 2021-03-08 DIAGNOSIS — D72.819 LEUKOPENIA, UNSPECIFIED TYPE: ICD-10-CM

## 2021-03-08 PROCEDURE — 99214 OFFICE O/P EST MOD 30 MIN: CPT | Mod: 95,CR | Performed by: PHYSICIAN ASSISTANT

## 2021-03-08 ASSESSMENT — FIBROSIS 4 INDEX: FIB4 SCORE: 1.31

## 2021-03-08 NOTE — PROGRESS NOTES
Virtual Visit: Established Patient   This visit was conducted via Zoom using secure and encrypted videoconferencing technology. The patient was in a private location in the state of [place].    The patient's identity was confirmed and verbal consent was obtained for this virtual visit.    Subjective:   CC:   Chief Complaint   Patient presents with   • Lab Results   • Dyslipidemia       Sheron Le is a 55 y.o. female presenting for evaluation and management of:      Menopausal state  Patient requested for female hormones.  Her FSH is elevated at 73 indicating that she most likely is.  LH is within normal limits at 44.6 again appropriate for postmenopausal range.  Estradiol is not 9.0, progesterone at 0.06, this is quite low.  Reviewed results in detail with the patient today.    Elevated TSH  TSH incidentally found to be low at 0.310.    Family history of hyperthyroidism? No FH of this.   Patient denies any palpitations, excessive sweating (she is hot flashes), diarrhea, or unintentional weight loss.    Dyslipidemia  This is a chronic condition.   Latest Labs:   Lab Results   Component Value Date/Time    CHOLSTRLTOT 235 (H) 03/02/2021 08:32 AM     (H) 03/02/2021 08:32 AM    HDL 56 03/02/2021 08:32 AM    TRIGLYCERIDE 228 (H) 03/02/2021 08:32 AM      Medications: none currently.  Discussed medication management versus lifestyle modifications.    Risk calculator: The 10-year ASCVD risk score (Mark PATRICK Jr., et al., 2013) is: 2.1%     Patient has started exercising more after she her labs. She has also reduced pastries and bread.     WBC decreased  Lab Results   Component Value Date/Time    WBC 3.9 (L) 03/02/2021 08:32 AM    RBC 4.47 03/02/2021 08:32 AM    HEMOGLOBIN 13.7 03/02/2021 08:32 AM    HEMATOCRIT 41.0 03/02/2021 08:32 AM    MCV 91.7 03/02/2021 08:32 AM    MCH 30.6 03/02/2021 08:32 AM    MCHC 33.4 (L) 03/02/2021 08:32 AM    RDW 43.0 03/02/2021 08:32 AM    PLATELETCT 245 03/02/2021 08:32 AM    MPV 10.7  "03/02/2021 08:32 AM    NEUTSPOLYS 57.10 03/02/2021 08:32 AM    LYMPHOCYTES 28.60 03/02/2021 08:32 AM    MONOCYTES 9.80 03/02/2021 08:32 AM    EOSINOPHILS 3.40 03/02/2021 08:32 AM    BASOPHILS 0.80 03/02/2021 08:32 AM    IMMGRAN 0.30 03/02/2021 08:32 AM    NRBC 0.00 03/02/2021 08:32 AM    NEUTS 2.22 03/02/2021 08:32 AM    LYMPHS 1.11 03/02/2021 08:32 AM    MONOS 0.38 03/02/2021 08:32 AM    EOS 0.13 03/02/2021 08:32 AM    BASO 0.03 03/02/2021 08:32 AM    IMMGRANAB 0.01 03/02/2021 08:32 AM    NRBCAB 0.00 03/02/2021 08:32 AM   ]  White blood cell count just slightly low, recommend repeating labs.      ROS   Denies any recent fevers or chills. No nausea or vomiting. No chest pains or shortness of breath.     Allergies   Allergen Reactions   • Aspirin Swelling     Face swelling       Current medicines (including changes today)  Current Outpatient Medications   Medication Sig Dispense Refill   • albuterol 108 (90 Base) MCG/ACT Aero Soln inhalation aerosol Inhale 2 Puffs by mouth every four hours as needed for Shortness of Breath. 1 Inhaler 2     No current facility-administered medications for this visit.       Patient Active Problem List    Diagnosis Date Noted   • Elevated TSH 03/05/2021   • Dyslipidemia 03/05/2021   • WBC decreased 03/05/2021   • Menopausal state 02/23/2021   • Mild intermittent asthma without complication 02/23/2021   • Tenderness 06/26/2020   • Headache 01/31/2020       Family History   Problem Relation Age of Onset   • Cancer Mother         uterine   • Heart Disease Mother    • Cancer Father         lymphoma   • Diabetes Maternal great-grandmother        She  has no past medical history on file.  She  has a past surgical history that includes ovarian cystectomy (1993).       Objective:   Resp 12   Ht 1.676 m (5' 6\")   Wt 83.9 kg (185 lb)   BMI 29.86 kg/m²     Physical Exam:  Constitutional: Alert, no distress, well-groomed.  Skin: No rashes in visible areas.  Eye: Round. Conjunctiva clear, lids " normal. No icterus.   ENMT: Lips pink without lesions, good dentition, moist mucous membranes. Phonation normal.  Neck: No masses, no thyromegaly. Moves freely without pain.  Respiratory: Unlabored respiratory effort, no cough or audible wheeze  Psych: Alert and oriented x3, normal affect and mood.       Assessment and Plan:   The following treatment plan was discussed:     1. Menopausal state  Reviewed hormone labs with patient today, do indicate that she is in a postmenopausal state.  Patient has occasional hot flashes but otherwise is tolerating the transition well.  2. Low TSH  - TSH; Future  - THYROID PEROXIDASE  (TPO) AB; Future  - T3 FREE; Future  - FREE THYROXINE; Future  - ANTITHYROGLOBULIN AB; Future  TSH found to be incidentally low, patient denies any palpitations, unintentional weight loss, diarrhea or excessive sweating.  Having occasional hot flashes, that could be associated with her postmenopausal state.  We will repeat labs with a full thyroid panel.  Patient denies any neck swelling or tenderness.  Hold on thyroid ultrasound.  3. Dyslipidemia  We will repeat labs in 90 days, patient wants to work on lifestyle modifications first.  She is already increased her exercise activity and is going to reduce her pastries and baked goods.  She does not do a lot of red meats she can increase her mean meats including chicken and fish.  4. Leukopenia, unspecified type  White blood cell count is slightly low on her last set of labs but the rest of differential was unremarkable.  We will continue to monitor.  Repeat labs in 3 months.      Follow-up: Return in about 4 weeks (around 4/5/2021) for Follow up on labs.        The patient verbalized agreement and understanding of current plan. All questions and concerns were addressed at time of visit.    Please note that this dictation was created using voice recognition software. I have made every reasonable attempt to correct obvious errors, but I expect that there  are errors of grammar and possibly content that I did not discover before finalizing the note.

## 2021-03-11 ENCOUNTER — TELEPHONE (OUTPATIENT)
Dept: MEDICAL GROUP | Facility: PHYSICIAN GROUP | Age: 56
End: 2021-03-11

## 2021-03-11 NOTE — TELEPHONE ENCOUNTER
----- Message from Amy Crowe P.A.-C. sent at 3/11/2021  9:33 AM PST -----  Please call patient about their results.     Results showed: Your Cologuard test was negative which indicates a more than 99% chance you do not have colon cancer. In light of these results, a colonscopy can be put off for 3 years.    Thank you,    Amy Crowe PA-C

## 2021-03-15 DIAGNOSIS — Z23 NEED FOR VACCINATION: ICD-10-CM

## 2021-03-24 ENCOUNTER — IMMUNIZATION (OUTPATIENT)
Dept: FAMILY PLANNING/WOMEN'S HEALTH CLINIC | Facility: IMMUNIZATION CENTER | Age: 56
End: 2021-03-24
Attending: INTERNAL MEDICINE
Payer: COMMERCIAL

## 2021-03-24 DIAGNOSIS — Z23 NEED FOR VACCINATION: ICD-10-CM

## 2021-03-24 DIAGNOSIS — Z23 ENCOUNTER FOR VACCINATION: Primary | ICD-10-CM

## 2021-03-24 PROCEDURE — 0001A PFIZER SARS-COV-2 VACCINE: CPT

## 2021-03-24 PROCEDURE — 91300 PFIZER SARS-COV-2 VACCINE: CPT

## 2021-04-05 ENCOUNTER — HOSPITAL ENCOUNTER (OUTPATIENT)
Dept: LAB | Facility: MEDICAL CENTER | Age: 56
End: 2021-04-05
Attending: PHYSICIAN ASSISTANT
Payer: COMMERCIAL

## 2021-04-05 DIAGNOSIS — R79.89 ELEVATED TSH: ICD-10-CM

## 2021-04-05 PROCEDURE — 86376 MICROSOMAL ANTIBODY EACH: CPT

## 2021-04-05 PROCEDURE — 36415 COLL VENOUS BLD VENIPUNCTURE: CPT

## 2021-04-06 LAB — THYROPEROXIDASE AB SERPL-ACNC: 92.9 IU/ML (ref 0–9)

## 2021-04-13 ENCOUNTER — HOSPITAL ENCOUNTER (OUTPATIENT)
Dept: LAB | Facility: MEDICAL CENTER | Age: 56
End: 2021-04-13
Attending: PHYSICIAN ASSISTANT
Payer: COMMERCIAL

## 2021-04-13 DIAGNOSIS — R79.89 ELEVATED TSH: ICD-10-CM

## 2021-04-13 LAB
T3FREE SERPL-MCNC: 5.16 PG/ML (ref 2–4.4)
T4 FREE SERPL-MCNC: 1.4 NG/DL (ref 0.93–1.7)
TSH SERPL DL<=0.005 MIU/L-ACNC: 0.01 UIU/ML (ref 0.38–5.33)

## 2021-04-13 PROCEDURE — 84439 ASSAY OF FREE THYROXINE: CPT

## 2021-04-13 PROCEDURE — 84443 ASSAY THYROID STIM HORMONE: CPT

## 2021-04-13 PROCEDURE — 84481 FREE ASSAY (FT-3): CPT

## 2021-04-13 PROCEDURE — 86800 THYROGLOBULIN ANTIBODY: CPT

## 2021-04-13 PROCEDURE — 36415 COLL VENOUS BLD VENIPUNCTURE: CPT

## 2021-04-15 ENCOUNTER — IMMUNIZATION (OUTPATIENT)
Dept: FAMILY PLANNING/WOMEN'S HEALTH CLINIC | Facility: IMMUNIZATION CENTER | Age: 56
End: 2021-04-15
Attending: INTERNAL MEDICINE
Payer: COMMERCIAL

## 2021-04-15 DIAGNOSIS — Z23 ENCOUNTER FOR VACCINATION: Primary | ICD-10-CM

## 2021-04-15 PROCEDURE — 91300 PFIZER SARS-COV-2 VACCINE: CPT

## 2021-04-15 PROCEDURE — 0002A PFIZER SARS-COV-2 VACCINE: CPT

## 2021-04-16 LAB — THYROGLOB AB SERPL-ACNC: 108.3 IU/ML (ref 0–4)

## 2021-04-19 ENCOUNTER — TELEPHONE (OUTPATIENT)
Dept: MEDICAL GROUP | Facility: PHYSICIAN GROUP | Age: 56
End: 2021-04-19

## 2021-04-19 DIAGNOSIS — E05.90 HYPERTHYROIDISM: ICD-10-CM

## 2021-04-19 NOTE — PROGRESS NOTES
Labs indicating hyperthyroidism, most likely Graves' disease.  Referring to endocrinology.  Patient most likely needs a thyroid uptake scan, having her schedule appointment for follow-up.

## 2021-04-22 NOTE — ASSESSMENT & PLAN NOTE
Patient's TSH from 3/2/2021 was found to be low so repeated full thyroid panel.  Repeat TSH confirms hyperthyroidism with a TSH of 0.010, free T3 elevated at 5.16, free T4 within normal limits at 1.40, antithyroglobulin elevated at 108.3 indicative of Graves' disease.  Discussed with patient referral to endocrinology for consult.  Will most likely need a radioiodine uptake scan as well.

## 2021-04-26 ENCOUNTER — TELEMEDICINE (OUTPATIENT)
Dept: MEDICAL GROUP | Facility: PHYSICIAN GROUP | Age: 56
End: 2021-04-26
Payer: COMMERCIAL

## 2021-04-26 VITALS — WEIGHT: 185 LBS | HEIGHT: 66 IN | RESPIRATION RATE: 12 BRPM | BODY MASS INDEX: 29.73 KG/M2

## 2021-04-26 DIAGNOSIS — E05.90 HYPERTHYROIDISM: ICD-10-CM

## 2021-04-26 PROCEDURE — 99213 OFFICE O/P EST LOW 20 MIN: CPT | Mod: 95,CR | Performed by: PHYSICIAN ASSISTANT

## 2021-04-26 ASSESSMENT — FIBROSIS 4 INDEX: FIB4 SCORE: 1.34

## 2021-04-26 NOTE — PROGRESS NOTES
Virtual Visit: Established Patient   This visit was conducted via Zoom using secure and encrypted videoconferencing technology. The patient was in a private location in the state of [place].    The patient's identity was confirmed and verbal consent was obtained for this virtual visit.    Subjective:   CC:   Chief Complaint   Patient presents with   • Lab Results   • Dyslipidemia       Sheron Le is a 56 y.o. female presenting for evaluation and management of:    Health Maintenance: Completed  Pap 2019- normal - per patient, done at [facility].   Mammogram 2019- normal at [facility] per patient.   Cologuard pended.    Chickenpox as kid.   Had flu shot at [facility]    Hyperthyroidism  Patient's TSH from 3/2/2021 was found to be low so repeated full thyroid panel.  Repeat TSH confirms hyperthyroidism with a TSH of 0.010, free T3 elevated at 5.16, free T4 within normal limits at 1.40, antithyroglobulin elevated at 108.3 indicative of Graves' disease.  Discussed with patient referral to endocrinology for consult.  Will most likely need a radioiodine uptake scan as well.      ROS   Denies any recent fevers or chills. No nausea or vomiting. No chest pains or shortness of breath.     Allergies   Allergen Reactions   • Aspirin Swelling     Face swelling       Current medicines (including changes today)  Current Outpatient Medications   Medication Sig Dispense Refill   • albuterol 108 (90 Base) MCG/ACT Aero Soln inhalation aerosol Inhale 2 Puffs by mouth every four hours as needed for Shortness of Breath. 1 Inhaler 2     No current facility-administered medications for this visit.       Patient Active Problem List    Diagnosis Date Noted   • Hyperthyroidism 03/05/2021   • Dyslipidemia 03/05/2021   • WBC decreased 03/05/2021   • Menopausal state 02/23/2021   • Mild intermittent asthma without complication 02/23/2021   • Tenderness 06/26/2020   • Headache 01/31/2020       Family History   Problem Relation Age of Onset   • Cancer Mother   "       uterine   • Heart Disease Mother    • Cancer Father         lymphoma   • Diabetes Maternal great-grandmother        She  has no past medical history on file.  She  has a past surgical history that includes ovarian cystectomy (1993).       Objective:   Resp 12   Ht 1.676 m (5' 6\")   Wt 83.9 kg (185 lb)   BMI 29.86 kg/m²     Physical Exam:  Constitutional: Alert, no distress, well-groomed.  Skin: No rashes in visible areas.  Eye: Round. Conjunctiva clear, lids normal. No icterus.   ENMT: Lips pink without lesions, good dentition, moist mucous membranes. Phonation normal.  Neck: No masses, no thyromegaly. Moves freely without pain.  Respiratory: Unlabored respiratory effort, no cough or audible wheeze  Psych: Alert and oriented x3, normal affect and mood.       Assessment and Plan:   The following treatment plan was discussed:     1. Hyperthyroidism  - NM-THYROID UPTAKE 5HR; Future  Patient has a follow-up scheduled with endocrinology in June.  In the interim of that time I will order radioiodine uptake scan for the patient.  Would like to see her back in 2 to 4 weeks to go over the results.  Patient is asymptomatic of any hyperthyroid symptoms.  Denies any unintentional weight loss, night sweats, palpitations, diarrhea.      Follow-up: Return in about 4 weeks (around 5/24/2021) for Follow up on radioiodine uptake scan .        The patient verbalized agreement and understanding of current plan. All questions and concerns were addressed at time of visit.    Please note that this dictation was created using voice recognition software. I have made every reasonable attempt to correct obvious errors, but I expect that there are errors of grammar and possibly content that I did not discover before finalizing the note.    "

## 2021-05-11 ENCOUNTER — APPOINTMENT (OUTPATIENT)
Dept: RADIOLOGY | Facility: MEDICAL CENTER | Age: 56
End: 2021-05-11
Attending: PHYSICIAN ASSISTANT
Payer: COMMERCIAL

## 2021-05-17 ENCOUNTER — HOSPITAL ENCOUNTER (OUTPATIENT)
Dept: RADIOLOGY | Facility: MEDICAL CENTER | Age: 56
End: 2021-05-17
Attending: PHYSICIAN ASSISTANT
Payer: COMMERCIAL

## 2021-05-17 DIAGNOSIS — E05.90 HYPERTHYROIDISM: ICD-10-CM

## 2021-05-17 PROCEDURE — 78014 THYROID IMAGING W/BLOOD FLOW: CPT

## 2021-05-18 ENCOUNTER — TELEPHONE (OUTPATIENT)
Dept: MEDICAL GROUP | Facility: PHYSICIAN GROUP | Age: 56
End: 2021-05-18

## 2021-07-19 ENCOUNTER — OFFICE VISIT (OUTPATIENT)
Dept: ENDOCRINOLOGY | Facility: MEDICAL CENTER | Age: 56
End: 2021-07-19
Attending: INTERNAL MEDICINE
Payer: COMMERCIAL

## 2021-07-19 ENCOUNTER — HOSPITAL ENCOUNTER (OUTPATIENT)
Dept: LAB | Facility: MEDICAL CENTER | Age: 56
End: 2021-07-19
Attending: INTERNAL MEDICINE
Payer: COMMERCIAL

## 2021-07-19 VITALS
SYSTOLIC BLOOD PRESSURE: 120 MMHG | OXYGEN SATURATION: 97 % | WEIGHT: 202.4 LBS | HEART RATE: 104 BPM | HEIGHT: 66 IN | BODY MASS INDEX: 32.53 KG/M2 | DIASTOLIC BLOOD PRESSURE: 68 MMHG

## 2021-07-19 DIAGNOSIS — E05.00 GRAVES DISEASE: ICD-10-CM

## 2021-07-19 DIAGNOSIS — E05.90 HYPERTHYROIDISM: ICD-10-CM

## 2021-07-19 DIAGNOSIS — Z79.899 HIGH RISK MEDICATION USE: ICD-10-CM

## 2021-07-19 LAB
ALBUMIN SERPL BCP-MCNC: 4 G/DL (ref 3.2–4.9)
ALBUMIN/GLOB SERPL: 1.3 G/DL
ALP SERPL-CCNC: 86 U/L (ref 30–99)
ALT SERPL-CCNC: 17 U/L (ref 2–50)
ANION GAP SERPL CALC-SCNC: 11 MMOL/L (ref 7–16)
AST SERPL-CCNC: 18 U/L (ref 12–45)
BASOPHILS # BLD AUTO: 0.2 % (ref 0–1.8)
BASOPHILS # BLD: 0.01 K/UL (ref 0–0.12)
BILIRUB SERPL-MCNC: 0.3 MG/DL (ref 0.1–1.5)
BUN SERPL-MCNC: 12 MG/DL (ref 8–22)
CALCIUM SERPL-MCNC: 9.7 MG/DL (ref 8.4–10.2)
CHLORIDE SERPL-SCNC: 104 MMOL/L (ref 96–112)
CO2 SERPL-SCNC: 22 MMOL/L (ref 20–33)
CREAT SERPL-MCNC: 0.62 MG/DL (ref 0.5–1.4)
EOSINOPHIL # BLD AUTO: 0.15 K/UL (ref 0–0.51)
EOSINOPHIL NFR BLD: 2.8 % (ref 0–6.9)
ERYTHROCYTE [DISTWIDTH] IN BLOOD BY AUTOMATED COUNT: 39 FL (ref 35.9–50)
GLOBULIN SER CALC-MCNC: 3 G/DL (ref 1.9–3.5)
GLUCOSE SERPL-MCNC: 95 MG/DL (ref 65–99)
HCT VFR BLD AUTO: 40.1 % (ref 37–47)
HGB BLD-MCNC: 13.1 G/DL (ref 12–16)
IMM GRANULOCYTES # BLD AUTO: 0.01 K/UL (ref 0–0.11)
IMM GRANULOCYTES NFR BLD AUTO: 0.2 % (ref 0–0.9)
LYMPHOCYTES # BLD AUTO: 1.77 K/UL (ref 1–4.8)
LYMPHOCYTES NFR BLD: 33.1 % (ref 22–41)
MCH RBC QN AUTO: 28.5 PG (ref 27–33)
MCHC RBC AUTO-ENTMCNC: 32.7 G/DL (ref 33.6–35)
MCV RBC AUTO: 87.2 FL (ref 81.4–97.8)
MONOCYTES # BLD AUTO: 0.5 K/UL (ref 0–0.85)
MONOCYTES NFR BLD AUTO: 9.4 % (ref 0–13.4)
NEUTROPHILS # BLD AUTO: 2.9 K/UL (ref 2–7.15)
NEUTROPHILS NFR BLD: 54.3 % (ref 44–72)
NRBC # BLD AUTO: 0 K/UL
NRBC BLD-RTO: 0 /100 WBC
PLATELET # BLD AUTO: 267 K/UL (ref 164–446)
PMV BLD AUTO: 9.9 FL (ref 9–12.9)
POTASSIUM SERPL-SCNC: 4 MMOL/L (ref 3.6–5.5)
PROT SERPL-MCNC: 7 G/DL (ref 6–8.2)
RBC # BLD AUTO: 4.6 M/UL (ref 4.2–5.4)
SODIUM SERPL-SCNC: 137 MMOL/L (ref 135–145)
T4 FREE SERPL-MCNC: 1.85 NG/DL (ref 0.93–1.7)
TSH SERPL DL<=0.005 MIU/L-ACNC: <0.005 UIU/ML (ref 0.38–5.33)
WBC # BLD AUTO: 5.3 K/UL (ref 4.8–10.8)

## 2021-07-19 PROCEDURE — 84439 ASSAY OF FREE THYROXINE: CPT

## 2021-07-19 PROCEDURE — 84445 ASSAY OF TSI GLOBULIN: CPT

## 2021-07-19 PROCEDURE — 84481 FREE ASSAY (FT-3): CPT

## 2021-07-19 PROCEDURE — 80053 COMPREHEN METABOLIC PANEL: CPT

## 2021-07-19 PROCEDURE — 85025 COMPLETE CBC W/AUTO DIFF WBC: CPT

## 2021-07-19 PROCEDURE — 84443 ASSAY THYROID STIM HORMONE: CPT

## 2021-07-19 PROCEDURE — 83520 IMMUNOASSAY QUANT NOS NONAB: CPT

## 2021-07-19 PROCEDURE — 36415 COLL VENOUS BLD VENIPUNCTURE: CPT

## 2021-07-19 PROCEDURE — 99211 OFF/OP EST MAY X REQ PHY/QHP: CPT | Performed by: INTERNAL MEDICINE

## 2021-07-19 PROCEDURE — 99204 OFFICE O/P NEW MOD 45 MIN: CPT | Performed by: INTERNAL MEDICINE

## 2021-07-19 ASSESSMENT — FIBROSIS 4 INDEX: FIB4 SCORE: 1.34

## 2021-07-20 DIAGNOSIS — E05.90 HYPERTHYROIDISM: ICD-10-CM

## 2021-07-20 LAB — T3FREE SERPL-MCNC: 7.59 PG/ML (ref 2–4.4)

## 2021-07-20 RX ORDER — METHIMAZOLE 5 MG/1
5 TABLET ORAL DAILY
Qty: 30 TABLET | Refills: 3 | Status: SHIPPED | OUTPATIENT
Start: 2021-07-20 | End: 2021-08-12

## 2021-07-20 NOTE — PROGRESS NOTES
Chief Complaint: Consult requested by Amy Crowe P.A.-C. for evaluation of Hyperthyroidism secondary to probable mild Graves' disease    HPI:     Sheron Le is a 56 y.o. female with history of hyperthyroidism secondary to Graves' disease which was incidentally discovered during a routine physical.      She was initially discovered to have a low TSH of 0.310 on March 2021 during her routine physical    Repeat labs 1 month later showed: TSH was low at 0.010 with a free T4 1.40 and free T3 that was slightly elevated at 5.16 on April 13, 2021  TPO antibodies were elevated 92  TSI and thyrotropin receptor antibodies were not measured      She denied having severe thyrotoxic symptoms such as palpitations, tremors, unexplained weight loss, nervousness, insomnia and heat intolerance and diarrhea      Thyroid uptake and scan on May 17, 2021 showed increased 5-hour uptake of 46.8% with a diffuse homogeneous pattern of uptake throughout the thyroid gland    She has not had a formal thyroid ultrasound          On evaluation today, she reports that she is doing well  Surprisingly she denies thyrotoxic symptoms as listed above  She also denies symptoms of thyroid eye disease such as eye pain, eye redness and eyelid swelling  We do not have updated thyroid labs  She is currently not on antithyroid medications  She denies a family history of hyperthyroidism but has a family history of Hashimoto's disease and hypothyroidism with her older sister.  She denies any recent pregnancies and denies taking thyroid hormone.  She denies any recent upper respiratory tract illness and IV contrast exposure          Patient's medications, allergies, and social histories were reviewed and updated as appropriate.      ROS:     CONS:     No fever, no chills, no weight loss, no fatigue   EYES:      No diplopia, no blurry vision, no redness of eyes, no swelling of eyelids   ENT:    No hearing loss, No ear pain, No sore throat, no  dysphagia, no neck swelling   CV:     No chest pain, no palpitations, no claudication, no orthopnea, no PND   PULM:    No SOB, no cough, no hemoptysis, no wheezing    GI:   No nausea, no vomiting, no diarrhea, no constipation, no bloody stools   :  Passing urine well, no dysuria, no hematuria   ENDO:   No polyuria, no polydipsia, no heat intolerance, no cold intolerance   NEURO: No headaches, no dizziness, no convulsions, no tremors   MUSC:  No joint swellings, no arthralgias, no myalgias, no weakness   SKIN:   No rash, no ulcers, no dry skin   PSYCH:   No depression, no anxiety, no difficulty sleeping       Past Medical History:  Patient Active Problem List    Diagnosis Date Noted   • Graves disease 07/19/2021   • Hyperthyroidism 03/05/2021   • Dyslipidemia 03/05/2021   • WBC decreased 03/05/2021   • Menopausal state 02/23/2021   • Mild intermittent asthma without complication 02/23/2021   • Tenderness 06/26/2020   • Headache 01/31/2020       Past Surgical History:  Past Surgical History:   Procedure Laterality Date   • OVARIAN CYSTECTOMY  1993        Allergies:  Aspirin     Current Medications:    Current Outpatient Medications:   •  albuterol 108 (90 Base) MCG/ACT Aero Soln inhalation aerosol, Inhale 2 Puffs by mouth every four hours as needed for Shortness of Breath., Disp: 1 Inhaler, Rfl: 2    Social History:  Social History     Socioeconomic History   • Marital status:      Spouse name: Not on file   • Number of children: Not on file   • Years of education: Not on file   • Highest education level: Not on file   Occupational History   • Not on file   Tobacco Use   • Smoking status: Never Smoker   • Smokeless tobacco: Never Used   Vaping Use   • Vaping Use: Never used   Substance and Sexual Activity   • Alcohol use: Yes     Comment: 1-2 drinks per month   • Drug use: Never   • Sexual activity: Yes     Partners: Male     Birth control/protection: Post-Menopausal, None   Other Topics Concern   • Not on  "file   Social History Narrative   • Not on file     Social Determinants of Health     Financial Resource Strain:    • Difficulty of Paying Living Expenses:    Food Insecurity:    • Worried About Running Out of Food in the Last Year:    • Ran Out of Food in the Last Year:    Transportation Needs:    • Lack of Transportation (Medical):    • Lack of Transportation (Non-Medical):    Physical Activity:    • Days of Exercise per Week:    • Minutes of Exercise per Session:    Stress:    • Feeling of Stress :    Social Connections:    • Frequency of Communication with Friends and Family:    • Frequency of Social Gatherings with Friends and Family:    • Attends [employer] Services:    • Active Member of Clubs or Organizations:    • Attends Club or Organization Meetings:    • Marital Status:    Intimate Partner Violence:    • Fear of Current or Ex-Partner:    • Emotionally Abused:    • Physically Abused:    • Sexually Abused:         Family History:   Family History   Problem Relation Age of Onset   • Cancer Mother         uterine   • Heart Disease Mother    • Cancer Father         lymphoma   • Diabetes Maternal great-grandmother          PHYSICAL EXAM:   Vital signs: /68 (BP Location: Left arm, Patient Position: Sitting, BP Cuff Size: Large adult)   Pulse (!) 104   Ht 1.676 m (5' 6\")   Wt 91.8 kg (202 lb 6.4 oz)   SpO2 97%   BMI 32.67 kg/m²   GENERAL: Well-developed, well-nourished  in no apparent distress.   EYE: No ocular and eyelid asymmetry, Anicteric sclerae,  PERRL, No exophthalmos or lidlag  HENT: Hearing grossly intact, Normocephalic, atraumatic. Pink, moist mucous membranes, No exudate  NECK: Supple. Trachea midline. thyroid is normal in size without nodules or tenderness  CARDIOVASCULAR: Regular rate and rhythm. No murmurs, rubs, or gallops.   LUNGS: Clear to auscultation bilaterally   ABDOMEN: Soft, nontender with positive bowel sounds.   EXTREMITIES: No clubbing, cyanosis, or edema.   NEUROLOGICAL: " Cranial nerves II-XII are grossly intact   Symmetric reflexes at the patella no proximal muscle weakness, No visible tremor with both outstretched hands  LYMPH: No cervical, supraclavicular,  adenopathy palpated.   SKIN: No rashes, lesions. Turgor is normal.    Labs:  Lab Results   Component Value Date/Time    WBC 5.3 07/19/2021 05:08 PM    RBC 4.60 07/19/2021 05:08 PM    HEMOGLOBIN 13.1 07/19/2021 05:08 PM    MCV 87.2 07/19/2021 05:08 PM    MCH 28.5 07/19/2021 05:08 PM    MCHC 32.7 (L) 07/19/2021 05:08 PM    RDW 39.0 07/19/2021 05:08 PM    MPV 9.9 07/19/2021 05:08 PM       Lab Results   Component Value Date/Time    SODIUM 138 03/02/2021 08:32 AM    POTASSIUM 4.2 03/02/2021 08:32 AM    CHLORIDE 103 03/02/2021 08:32 AM    CO2 26 03/02/2021 08:32 AM    ANION 9.0 03/02/2021 08:32 AM    GLUCOSE 86 03/02/2021 08:32 AM    BUN 9 03/02/2021 08:32 AM    CREATININE 0.60 03/02/2021 08:32 AM    CALCIUM 10.2 03/02/2021 08:32 AM    ASTSGOT 31 03/02/2021 08:32 AM    ALTSGPT 27 03/02/2021 08:32 AM    TBILIRUBIN 0.4 03/02/2021 08:32 AM    ALBUMIN 4.2 03/02/2021 08:32 AM    TOTPROTEIN 7.3 03/02/2021 08:32 AM    GLOBULIN 3.1 03/02/2021 08:32 AM    AGRATIO 1.4 03/02/2021 08:32 AM       Lab Results   Component Value Date/Time    TSHULTRASEN 0.010 (L) 04/13/2021 0743     Lab Results   Component Value Date/Time    FREET4 1.40 04/13/2021 0743     Lab Results   Component Value Date/Time    FREET3 5.16 (H) 04/13/2021 0743     No results found for: THYSTIMIG      Imaging:      ASSESSMENT/PLAN:     1. Hyperthyroidism  Unstable  Patient has biochemical Graves' disease based on labs from 3 months ago  Clinically she is asymptomatic  I want to repeat her labs again today to make sure that she is still biochemically hyperthyroid  I am checking a TSH free T4 and free T3 today and I am also checking the thyroid autoantibodies associated with Graves' hyperthyroidism particularly the TSI and thyrotropin receptor antibodies    If she is still  hyperthyroid I will start her on low-dose methimazole because her labs are not severely abnormal and she is asymptomatic treatment is still indicated because her TSH is suppressed as this will lead to bone loss and increased risk of atrial arrhythmias    However if her repeat labs show that she is euthyroid I will continue to monitor her  I want her to follow-up with our nurse practitioner in 6 weeks with repeat of her TSH and free T4 and free T3 levels      2. Graves disease  This is the likely etiology of her hyperthyroidism as proven by her abnormal thyroid uptake and scan    3. High risk medication use  Patient is likely to start methimazole which is a high risk medication I reviewed the side effects of this medication      Return in about 6 weeks (around 8/30/2021).      Thank you kindly for allowing me to participate in the thyroid care plan for this patient.    Ton Frausto MD, [facility], [facility]  07/19/21    CC:   Amy Crowe P.A.-C.

## 2021-07-21 LAB — TSI SER-ACNC: 0.71 IU/L

## 2021-07-23 LAB — TSH RECEP AB SER-ACNC: 2.17 IU/L

## 2021-08-12 DIAGNOSIS — E05.90 HYPERTHYROIDISM: ICD-10-CM

## 2021-08-12 RX ORDER — METHIMAZOLE 5 MG/1
5 TABLET ORAL DAILY
Qty: 30 TABLET | Refills: 3 | Status: SHIPPED | OUTPATIENT
Start: 2021-08-12 | End: 2021-12-06

## 2021-08-27 ENCOUNTER — HOSPITAL ENCOUNTER (OUTPATIENT)
Dept: LAB | Facility: MEDICAL CENTER | Age: 56
End: 2021-08-27
Attending: INTERNAL MEDICINE
Payer: COMMERCIAL

## 2021-08-27 DIAGNOSIS — Z79.899 HIGH RISK MEDICATION USE: ICD-10-CM

## 2021-08-27 DIAGNOSIS — E05.00 GRAVES DISEASE: ICD-10-CM

## 2021-08-27 DIAGNOSIS — E05.90 HYPERTHYROIDISM: ICD-10-CM

## 2021-08-27 LAB
T3FREE SERPL-MCNC: 3.28 PG/ML (ref 2–4.4)
T4 FREE SERPL-MCNC: 0.99 NG/DL (ref 0.93–1.7)
TSH SERPL DL<=0.005 MIU/L-ACNC: 0.02 UIU/ML (ref 0.38–5.33)

## 2021-08-27 PROCEDURE — 84481 FREE ASSAY (FT-3): CPT

## 2021-08-27 PROCEDURE — 84439 ASSAY OF FREE THYROXINE: CPT

## 2021-08-27 PROCEDURE — 36415 COLL VENOUS BLD VENIPUNCTURE: CPT

## 2021-08-27 PROCEDURE — 84443 ASSAY THYROID STIM HORMONE: CPT

## 2021-09-03 ENCOUNTER — OFFICE VISIT (OUTPATIENT)
Dept: ENDOCRINOLOGY | Facility: MEDICAL CENTER | Age: 56
End: 2021-09-03
Attending: NURSE PRACTITIONER
Payer: COMMERCIAL

## 2021-09-03 VITALS
DIASTOLIC BLOOD PRESSURE: 78 MMHG | OXYGEN SATURATION: 98 % | BODY MASS INDEX: 32.95 KG/M2 | SYSTOLIC BLOOD PRESSURE: 124 MMHG | HEIGHT: 66 IN | WEIGHT: 205 LBS

## 2021-09-03 DIAGNOSIS — E05.90 HYPERTHYROIDISM: ICD-10-CM

## 2021-09-03 DIAGNOSIS — Z79.899 HIGH RISK MEDICATION USE: ICD-10-CM

## 2021-09-03 DIAGNOSIS — E05.00 GRAVES DISEASE: ICD-10-CM

## 2021-09-03 PROBLEM — M62.462 GASTROCNEMIUS EQUINUS OF LEFT LOWER EXTREMITY: Status: ACTIVE | Noted: 2018-06-08

## 2021-09-03 PROBLEM — M62.461 GASTROCNEMIUS EQUINUS, RIGHT: Status: ACTIVE | Noted: 2018-06-08

## 2021-09-03 PROBLEM — M21.41 BILATERAL PES PLANUS: Status: ACTIVE | Noted: 2018-06-08

## 2021-09-03 PROBLEM — M21.42 BILATERAL PES PLANUS: Status: ACTIVE | Noted: 2018-06-08

## 2021-09-03 PROBLEM — M79.671 FOOT PAIN, RIGHT: Status: ACTIVE | Noted: 2017-03-21

## 2021-09-03 PROCEDURE — 99211 OFF/OP EST MAY X REQ PHY/QHP: CPT | Performed by: NURSE PRACTITIONER

## 2021-09-03 PROCEDURE — 99214 OFFICE O/P EST MOD 30 MIN: CPT | Performed by: NURSE PRACTITIONER

## 2021-09-03 ASSESSMENT — FIBROSIS 4 INDEX: FIB4 SCORE: 0.9

## 2021-09-03 NOTE — PROGRESS NOTES
Chief Complaint: Consult requested by Amy Crowe P.A.-C. for evaluation of Hyperthyroidism secondary to probable mild Graves' disease    HPI:     Sheron Le is a 56 y.o. female for continued evaluation & treatment of the followin. Graves' Disease   This was incidentally discovered during a routine physical.    Initially discovered to have a low TSH of 0.310 on 2021 during her routine physical    Labs prior to treatment:      Ref. Range 2021 08:31 2021 07:43   TSH Latest Ref Range: 0.380 - 5.330 uIU/mL  0.010 (L)   Free T-4 Latest Ref Range: 0.93 - 1.70 ng/dL  1.40   T3,Free Latest Ref Range: 2.00 - 4.40 pg/mL  5.16 (H)   Microsomal -Tpo- Abs Latest Ref Range: 0.0 - 9.0 IU/mL 92.9 (H)    Anti-Thyroglobulin Latest Ref Range: 0.0 - 4.0 IU/mL  108.3 (H)     Thyroid uptake and scan on May 17, 2021 showed increased 5-hour uptake of 46.8% with a diffuse homogeneous pattern of uptake throughout the thyroid gland.     Patient denies having severe thyrotoxic symptoms such as palpitations, tremors, unexplained weight loss, nervousness, insomnia and heat intolerance and diarrhea.  Patient denies enlargement of neck and anterior neck tenderness.     Currently taking Methimazole 5mg daily.     Current Labs:      Ref. Range 2021 10:39   TSH Latest Ref Range: 0.380 - 5.330 uIU/mL 0.016 (L)   Free T-4 Latest Ref Range: 0.93 - 1.70 ng/dL 0.99   T3,Free Latest Ref Range: 2.00 - 4.40 pg/mL 3.28       POC US ordered by Dr Frausto has not been scheduled or completed to this date.       Patient's medications, allergies, and social histories were reviewed and updated as appropriate.      ROS:     CONS:     No fever, no chills, no weight loss, no fatigue   EYES:      No diplopia, no blurry vision, no redness of eyes, no swelling of eyelids   ENT:    No hearing loss, No ear pain, No sore throat, no dysphagia, no neck swelling   CV:     No chest pain, no palpitations, no claudication, no orthopnea,  no PND   PULM:    No SOB, no cough, no hemoptysis, no wheezing    GI:   No nausea, no vomiting, no diarrhea, no constipation, no bloody stools   :  Passing urine well, no dysuria, no hematuria   ENDO:   No polyuria, no polydipsia, no heat intolerance, no cold intolerance   NEURO: No headaches, no dizziness, no convulsions, no tremors   MUSC:  No joint swellings, no arthralgias, no myalgias, no weakness   SKIN:   No rash, no ulcers, no dry skin   PSYCH:   No depression, no anxiety, no difficulty sleeping       Past Medical History:  Patient Active Problem List    Diagnosis Date Noted   • Graves disease 07/19/2021   • Hyperthyroidism 03/05/2021   • Dyslipidemia 03/05/2021   • WBC decreased 03/05/2021   • Menopausal state 02/23/2021   • Mild intermittent asthma without complication 02/23/2021   • Tenderness 06/26/2020   • Headache 01/31/2020       Past Surgical History:  Past Surgical History:   Procedure Laterality Date   • OVARIAN CYSTECTOMY  1993        Allergies:  Aspirin     Current Medications:    Current Outpatient Medications:   •  methimazole (TAPAZOLE) 5 MG Tab, TAKE 1 TABLET BY MOUTH EVERY DAY, Disp: 30 Tablet, Rfl: 3  •  albuterol 108 (90 Base) MCG/ACT Aero Soln inhalation aerosol, Inhale 2 Puffs by mouth every four hours as needed for Shortness of Breath., Disp: 1 Inhaler, Rfl: 2    Social History:  Social History     Socioeconomic History   • Marital status:      Spouse name: Not on file   • Number of children: Not on file   • Years of education: Not on file   • Highest education level: Not on file   Occupational History   • Not on file   Tobacco Use   • Smoking status: Never Smoker   • Smokeless tobacco: Never Used   Vaping Use   • Vaping Use: Never used   Substance and Sexual Activity   • Alcohol use: Yes     Comment: 1-2 drinks per month   • Drug use: Never   • Sexual activity: Yes     Partners: Male     Birth control/protection: Post-Menopausal, None   Other Topics Concern   • Not on file  "  Social History Narrative   • Not on file     Social Determinants of Health     Financial Resource Strain:    • Difficulty of Paying Living Expenses:    Food Insecurity:    • Worried About Running Out of Food in the Last Year:    • Ran Out of Food in the Last Year:    Transportation Needs:    • Lack of Transportation (Medical):    • Lack of Transportation (Non-Medical):    Physical Activity:    • Days of Exercise per Week:    • Minutes of Exercise per Session:    Stress:    • Feeling of Stress :    Social Connections:    • Frequency of Communication with Friends and Family:    • Frequency of Social Gatherings with Friends and Family:    • Attends [employer] Services:    • Active Member of Clubs or Organizations:    • Attends Club or Organization Meetings:    • Marital Status:    Intimate Partner Violence:    • Fear of Current or Ex-Partner:    • Emotionally Abused:    • Physically Abused:    • Sexually Abused:         Family History:   Family History   Problem Relation Age of Onset   • Cancer Mother         uterine   • Heart Disease Mother    • Cancer Father         lymphoma   • Diabetes Maternal great-grandmother          PHYSICAL EXAM:   Vital signs: /78 (BP Location: Left arm, Patient Position: Sitting, BP Cuff Size: Adult)   Ht 1.676 m (5' 6\")   Wt 93 kg (205 lb)   SpO2 98%   BMI 33.09 kg/m²   GENERAL: Well-developed, well-nourished  in no apparent distress.   EYE: No ocular and eyelid asymmetry, Anicteric sclerae,  PERRL, No exophthalmos or lidlag  HENT: Hearing grossly intact, Normocephalic, atraumatic. Pink, moist mucous membranes, No exudate  NECK: Supple. Trachea midline. thyroid is normal in size without nodules or tenderness  CARDIOVASCULAR: Regular rate and rhythm. No murmurs, rubs, or gallops.   LUNGS: Clear to auscultation bilaterally   ABDOMEN: Soft, nontender with positive bowel sounds.   EXTREMITIES: No clubbing, cyanosis, or edema.   NEUROLOGICAL: Cranial nerves II-XII are grossly intact "   Symmetric reflexes at the patella no proximal muscle weakness, No visible tremor with both outstretched hands  LYMPH: No cervical, supraclavicular,  adenopathy palpated.   SKIN: No rashes, lesions. Turgor is normal.    ASSESSMENT/PLAN:     1. Hyperthyroidism  Stable.   TSH continues to recover, slightly improved at 0.005. FT4 WNL currently.   Patient prefers to stay on Methimazole 5mg daily at this time.     Detailed discussion with patient regarding hypothyroid symptoms. Patient to notify provider if she has increased fatigue, cold intolerance and mental fogginess.     2. Graves disease  Stable.   This is the likely etiology of her hyperthyroidism as proven by her abnormal thyroid uptake and scan    3. High risk medication use  Patient is likely to start methimazole which is a high risk medication I reviewed the side effects of this medication      Complete labs 1-2 weeks before next appointment.   Next appointment in 2 monhts.     Thank you kindly for allowing me to participate in the thyroid care plan for this patient.    Yi Floyd, APRN  09/03/2021    CC:   Amy Crowe P.A.-C.

## 2021-09-11 ENCOUNTER — OFFICE VISIT (OUTPATIENT)
Dept: URGENT CARE | Facility: PHYSICIAN GROUP | Age: 56
End: 2021-09-11
Payer: COMMERCIAL

## 2021-09-11 VITALS
BODY MASS INDEX: 31.82 KG/M2 | HEIGHT: 66 IN | DIASTOLIC BLOOD PRESSURE: 78 MMHG | HEART RATE: 81 BPM | WEIGHT: 198 LBS | RESPIRATION RATE: 14 BRPM | SYSTOLIC BLOOD PRESSURE: 114 MMHG | OXYGEN SATURATION: 97 % | TEMPERATURE: 98.1 F

## 2021-09-11 DIAGNOSIS — S83.411A SPRAIN OF MEDIAL COLLATERAL LIGAMENT OF RIGHT KNEE, INITIAL ENCOUNTER: ICD-10-CM

## 2021-09-11 PROCEDURE — 99213 OFFICE O/P EST LOW 20 MIN: CPT | Performed by: FAMILY MEDICINE

## 2021-09-11 ASSESSMENT — ENCOUNTER SYMPTOMS
FEVER: 0
SORE THROAT: 0
COUGH: 0
VOMITING: 0

## 2021-09-11 ASSESSMENT — FIBROSIS 4 INDEX: FIB4 SCORE: 0.9

## 2021-09-11 NOTE — PROGRESS NOTES
"Subjective:     Sheron Le is a 56 y.o. female who presents for Knee Pain (R knee, 1 week)    HPI  Pt presents for evaluation of an acute problem  Patient with a right knee pain for the past week  Started when she was more active putting some fencing in the yard   Does recall awkwardly standing from a leg crossed position   Pain is more along the medial knee   Pain is worse with ambulation     Review of Systems   Constitutional: Negative for fever.   HENT: Negative for sore throat.    Respiratory: Negative for cough.    Gastrointestinal: Negative for vomiting.   Skin: Negative for rash.       PMH:  has no past medical history on file.  MEDS:   Current Outpatient Medications:   •  methimazole (TAPAZOLE) 5 MG Tab, TAKE 1 TABLET BY MOUTH EVERY DAY, Disp: 30 Tablet, Rfl: 3  •  albuterol 108 (90 Base) MCG/ACT Aero Soln inhalation aerosol, Inhale 2 Puffs by mouth every four hours as needed for Shortness of Breath., Disp: 1 Inhaler, Rfl: 2  ALLERGIES:   Allergies   Allergen Reactions   • Aspirin Swelling     Face swelling     SURGHX:   Past Surgical History:   Procedure Laterality Date   • OVARIAN CYSTECTOMY  1993     SOCHX:  reports that she has never smoked. She has never used smokeless tobacco. She reports current alcohol use. She reports that she does not use drugs.     Objective:   /78 (BP Location: Left arm, Patient Position: Sitting, BP Cuff Size: Adult)   Pulse 81   Temp 36.7 °C (98.1 °F) (Temporal)   Resp 14   Ht 1.676 m (5' 6\")   Wt 89.8 kg (198 lb)   SpO2 97%   BMI 31.96 kg/m²     Physical Exam  Constitutional:       General: She is not in acute distress.     Appearance: She is well-developed. She is not diaphoretic.   Pulmonary:      Effort: Pulmonary effort is normal.   Neurological:      Mental Status: She is alert.     Right knee  Appearance - No deformity appreciated  Palpation - +TTP along pes anserine and medial knee  ROM - FROM without crepitus  Strength - 5/5 throughout  Neuro - " Sensation equal and intact bilaterally  Special testing - No laxity with varus/valgus stress, +medial pain with valgus stress, neg anterior drawer, neg posterior drawer, neg Lachman's, neg Paul's, neg patellar apprehension test    Assessment/Plan:   Assessment    1. Sprain of medial collateral ligament of right knee, initial encounter    Patient with MCL sprain and medial quad strain.  She has no laxity on exam.  She was instructed to start home exercise program and handout given.  Given a hinged knee brace and advised to also use topical diclofenac for the next week.  Follow-up with PCP if not resolving over the next few weeks.

## 2021-09-20 DIAGNOSIS — E05.90 HYPERTHYROIDISM: ICD-10-CM

## 2021-09-20 DIAGNOSIS — E05.00 GRAVES DISEASE: ICD-10-CM

## 2021-09-24 DIAGNOSIS — E05.00 GRAVES DISEASE: ICD-10-CM

## 2021-09-27 ENCOUNTER — PROCEDURE VISIT (OUTPATIENT)
Dept: ENDOCRINOLOGY | Facility: MEDICAL CENTER | Age: 56
End: 2021-09-27
Attending: INTERNAL MEDICINE
Payer: COMMERCIAL

## 2021-09-27 DIAGNOSIS — E05.90 HYPERTHYROIDISM: ICD-10-CM

## 2021-09-27 DIAGNOSIS — E05.00 GRAVES DISEASE: ICD-10-CM

## 2021-09-27 PROCEDURE — 76536 US EXAM OF HEAD AND NECK: CPT | Performed by: INTERNAL MEDICINE

## 2021-09-29 ENCOUNTER — HOSPITAL ENCOUNTER (OUTPATIENT)
Facility: MEDICAL CENTER | Age: 56
End: 2021-09-29
Attending: PHYSICIAN ASSISTANT
Payer: COMMERCIAL

## 2021-09-29 ENCOUNTER — OFFICE VISIT (OUTPATIENT)
Dept: URGENT CARE | Facility: PHYSICIAN GROUP | Age: 56
End: 2021-09-29
Payer: COMMERCIAL

## 2021-09-29 VITALS
HEART RATE: 81 BPM | RESPIRATION RATE: 16 BRPM | TEMPERATURE: 97.3 F | HEIGHT: 66 IN | OXYGEN SATURATION: 97 % | WEIGHT: 199 LBS | SYSTOLIC BLOOD PRESSURE: 112 MMHG | BODY MASS INDEX: 31.98 KG/M2 | DIASTOLIC BLOOD PRESSURE: 76 MMHG

## 2021-09-29 DIAGNOSIS — R35.0 URINARY FREQUENCY: ICD-10-CM

## 2021-09-29 DIAGNOSIS — R31.9 URINARY TRACT INFECTION WITH HEMATURIA, SITE UNSPECIFIED: ICD-10-CM

## 2021-09-29 DIAGNOSIS — N39.0 URINARY TRACT INFECTION WITH HEMATURIA, SITE UNSPECIFIED: ICD-10-CM

## 2021-09-29 LAB
APPEARANCE UR: CLEAR
BILIRUB UR STRIP-MCNC: NORMAL MG/DL
COLOR UR AUTO: YELLOW
GLUCOSE UR STRIP.AUTO-MCNC: NORMAL MG/DL
KETONES UR STRIP.AUTO-MCNC: NORMAL MG/DL
LEUKOCYTE ESTERASE UR QL STRIP.AUTO: NORMAL
NITRITE UR QL STRIP.AUTO: NORMAL
PH UR STRIP.AUTO: 6 [PH] (ref 5–8)
PROT UR QL STRIP: 30 MG/DL
RBC UR QL AUTO: NORMAL
SP GR UR STRIP.AUTO: 1.02
UROBILINOGEN UR STRIP-MCNC: 0.2 MG/DL

## 2021-09-29 PROCEDURE — 87086 URINE CULTURE/COLONY COUNT: CPT

## 2021-09-29 PROCEDURE — 99213 OFFICE O/P EST LOW 20 MIN: CPT | Performed by: PHYSICIAN ASSISTANT

## 2021-09-29 PROCEDURE — 87186 SC STD MICRODIL/AGAR DIL: CPT

## 2021-09-29 PROCEDURE — 87077 CULTURE AEROBIC IDENTIFY: CPT

## 2021-09-29 PROCEDURE — 81002 URINALYSIS NONAUTO W/O SCOPE: CPT | Performed by: PHYSICIAN ASSISTANT

## 2021-09-29 RX ORDER — CEFDINIR 300 MG/1
300 CAPSULE ORAL EVERY 12 HOURS
Qty: 10 CAPSULE | Refills: 0 | Status: SHIPPED | OUTPATIENT
Start: 2021-09-29 | End: 2021-10-04

## 2021-09-29 ASSESSMENT — ENCOUNTER SYMPTOMS
SHORTNESS OF BREATH: 0
FEVER: 0
FLANK PAIN: 0
HEADACHES: 0
EYE DISCHARGE: 0
COUGH: 0
NAUSEA: 0
VOMITING: 0
EYE REDNESS: 0
SORE THROAT: 0
ABDOMINAL PAIN: 1

## 2021-09-29 ASSESSMENT — FIBROSIS 4 INDEX: FIB4 SCORE: 0.9

## 2021-09-29 NOTE — PROGRESS NOTES
Subjective     mena Le is a 56 y.o. female who presents with Urinary Frequency (abd pressure x 2 days)            Urinary Frequency  This is a new problem. Episode onset: x 3 days ago. The problem occurs constantly. The problem has been unchanged. Associated symptoms include abdominal pain (The patient reports associated abdominal pressure overlying the bladder.) and urinary symptoms (The patient reports associated urinary frequency and urinary urgency.  The patient states she was experiencing hematuria at the onset of symptoms, now resolved.  She reports no dysuria.). Pertinent negatives include no chest pain, congestion, coughing, fever, headaches, nausea, rash, sore throat or vomiting. She has tried nothing for the symptoms.     PMH:  has no past medical history on file.  MEDS:   Current Outpatient Medications:   •  methimazole (TAPAZOLE) 5 MG Tab, TAKE 1 TABLET BY MOUTH EVERY DAY, Disp: 30 Tablet, Rfl: 3  •  albuterol 108 (90 Base) MCG/ACT Aero Soln inhalation aerosol, Inhale 2 Puffs by mouth every four hours as needed for Shortness of Breath., Disp: 1 Inhaler, Rfl: 2  ALLERGIES:   Allergies   Allergen Reactions   • Aspirin Swelling     Face swelling     SURGHX:   Past Surgical History:   Procedure Laterality Date   • OVARIAN CYSTECTOMY  1993     SOCHX:  reports that she has never smoked. She has never used smokeless tobacco. She reports current alcohol use. She reports that she does not use drugs.  FH: Family history was reviewed, no pertinent findings to report      Review of Systems   Constitutional: Negative for fever.   HENT: Negative for congestion, ear pain and sore throat.    Eyes: Negative for discharge and redness.   Respiratory: Negative for cough and shortness of breath.    Cardiovascular: Negative for chest pain and leg swelling.   Gastrointestinal: Positive for abdominal pain (The patient reports associated abdominal pressure overlying the bladder.). Negative for nausea and vomiting.  "  Genitourinary: Positive for frequency, hematuria and urgency. Negative for dysuria and flank pain.   Musculoskeletal: Negative for joint pain.   Skin: Negative for rash.   Neurological: Negative for headaches.              Objective     /76 (BP Location: Left arm, Patient Position: Sitting, BP Cuff Size: Adult)   Pulse 81   Temp 36.3 °C (97.3 °F)   Resp 16   Ht 1.676 m (5' 6\")   Wt 90.3 kg (199 lb)   SpO2 97%   BMI 32.12 kg/m²      Physical Exam  Constitutional:       General: She is not in acute distress.     Appearance: Normal appearance. She is well-developed. She is not ill-appearing.   HENT:      Head: Normocephalic and atraumatic.      Right Ear: External ear normal.      Left Ear: External ear normal.   Eyes:      Extraocular Movements: Extraocular movements intact.      Conjunctiva/sclera: Conjunctivae normal.   Cardiovascular:      Rate and Rhythm: Normal rate and regular rhythm.      Heart sounds: Normal heart sounds.   Pulmonary:      Effort: Pulmonary effort is normal. No respiratory distress.      Breath sounds: Normal breath sounds. No wheezing.   Abdominal:      Palpations: Abdomen is soft.      Tenderness: There is no abdominal tenderness. There is no right CVA tenderness.   Musculoskeletal:         General: Normal range of motion.      Cervical back: Normal range of motion and neck supple.   Skin:     General: Skin is warm and dry.   Neurological:      Mental Status: She is alert and oriented to person, place, and time.               Progress:  Results for orders placed or performed in visit on 09/29/21   POCT Urinalysis   Result Value Ref Range    POC Color yellow Negative    POC Appearance clear Negative    POC Leukocyte Esterase sm Negative    POC Nitrites neg Negative    POC Urobiligen 0.2 Negative (0.2) mg/dL    POC Protein 30 Negative mg/dL    POC Urine PH 6.0 5.0 - 8.0    POC Blood lg Negative    POC Specific Gravity 1.025 <1.005 - >1.030    POC Ketones neg Negative mg/dL    " POC Bilirubin neg Negative mg/dL    POC Glucose neg Negative mg/dL       Urine Culture - pending               Assessment & Plan          1. Urinary frequency  - POCT Urinalysis  - Urine Culture; Future    2. Urinary tract infection with hematuria, site unspecified  - cefdinir (OMNICEF) 300 MG Cap; Take 1 Capsule by mouth every 12 hours for 5 days.  Dispense: 10 Capsule; Refill: 0    The patient's presenting symptoms and physical exam findings are consistent with urinary frequency likely secondary to an acute urinary tract infection.  The patient's physical exam today clinic was normal.  No CVA tenderness was appreciated.  The patient's POCT urinalysis today in clinic showed small leukocyte esterase and large blood with negative nitrites.  We will culture the patient's urine to identify a possible bacterial source.  Will prescribe the patient cefdinir for presumed urinary tract infection.  Based on the patient's presenting symptoms and physical exam findings, I have low clinical suspicion for acute pyelonephritis.  Recommend OTC medications and supportive care for symptomatic management.  Recommend patient follow-up with her PCP as needed.  Discussed return precautions with the patient, and she verbalized understanding.    Differential diagnoses, supportive care, and indications for immediate follow-up discussed with patient.   Instructed to return to clinic or nearest emergency department for any change in condition, further concerns, or worsening of symptoms.    OTC Tylenol or Motrin for fever/discomfort.  Drink plenty of fluids  Follow-up with PCP  Return to clinic or go to the ED if symptoms worsen or fail to improve, or if the patient should develop worsening/increasing urinary symptoms, hematuria, flank pain, abdominal pain, nausea/vomiting, fever/chills, and/or any concerning symptoms.    Discussed plan with the patient, and she agrees to the above.     I personally reviewed prior external notes and test  results pertinent to today's visit.  I have independently reviewed and interpreted all diagnostics ordered during this urgent care visit.     Time spent evaluating this patient was at least 30 minutes and includes preparing for visit, obtaining history, exam and evaluation, ordering labs/tests/procedures/medications, independent interpretation, and counseling/education.    Please note that this dictation was created using voice recognition software. I have made every reasonable attempt to correct obvious errors, but I expect that there may be errors of grammar and possibly content that I did not discover before finalizing the note.       This note was electronically signed by Jory Smith PA-C

## 2021-10-02 LAB
BACTERIA UR CULT: ABNORMAL
BACTERIA UR CULT: ABNORMAL
SIGNIFICANT IND 70042: ABNORMAL
SITE SITE: ABNORMAL
SOURCE SOURCE: ABNORMAL

## 2021-10-07 ENCOUNTER — OFFICE VISIT (OUTPATIENT)
Dept: URGENT CARE | Facility: PHYSICIAN GROUP | Age: 56
End: 2021-10-07
Payer: COMMERCIAL

## 2021-10-07 VITALS
DIASTOLIC BLOOD PRESSURE: 76 MMHG | TEMPERATURE: 98.3 F | HEIGHT: 67 IN | WEIGHT: 201 LBS | SYSTOLIC BLOOD PRESSURE: 124 MMHG | OXYGEN SATURATION: 98 % | RESPIRATION RATE: 16 BRPM | BODY MASS INDEX: 31.55 KG/M2 | HEART RATE: 77 BPM

## 2021-10-07 DIAGNOSIS — H57.89 EYE LUMP: ICD-10-CM

## 2021-10-07 DIAGNOSIS — H10.13 ALLERGIC CONJUNCTIVITIS OF BOTH EYES: ICD-10-CM

## 2021-10-07 PROCEDURE — 99213 OFFICE O/P EST LOW 20 MIN: CPT | Performed by: FAMILY MEDICINE

## 2021-10-07 RX ORDER — OLOPATADINE HYDROCHLORIDE 1 MG/ML
1 SOLUTION/ DROPS OPHTHALMIC 2 TIMES DAILY
Qty: 5 ML | Refills: 0 | Status: SHIPPED | OUTPATIENT
Start: 2021-10-07 | End: 2021-10-11

## 2021-10-07 ASSESSMENT — FIBROSIS 4 INDEX: FIB4 SCORE: 0.9

## 2021-10-07 ASSESSMENT — ENCOUNTER SYMPTOMS
FEVER: 0
SORE THROAT: 0
COUGH: 0
VOMITING: 0

## 2021-10-07 NOTE — PROGRESS NOTES
"Subjective:     Sheron Le is a 56 y.o. female who presents for Eye Problem (OD swelling, bump undewer eye. Onset 3 days. )    HPI  Pt presents for evaluation of an acute problem  Patient with lower eyelid swelling for the past 3 days   Lump is painless and stable in size  Does feel that the lump moves around a little  Trying to use warm compresses without much improvement  Has had some increase in eye watering bilaterally recently  No changes in vision or blurry vision  No purulent discharge in eye    Review of Systems   Constitutional: Negative for fever.   HENT: Negative for sore throat.    Respiratory: Negative for cough.    Gastrointestinal: Negative for vomiting.   Skin: Negative for rash.     PMH:  has no past medical history on file.  MEDS:   Current Outpatient Medications:   •  methimazole (TAPAZOLE) 5 MG Tab, TAKE 1 TABLET BY MOUTH EVERY DAY, Disp: 30 Tablet, Rfl: 3  •  albuterol 108 (90 Base) MCG/ACT Aero Soln inhalation aerosol, Inhale 2 Puffs by mouth every four hours as needed for Shortness of Breath., Disp: 1 Inhaler, Rfl: 2  ALLERGIES:   Allergies   Allergen Reactions   • Aspirin Swelling     Face swelling     SURGHX:   Past Surgical History:   Procedure Laterality Date   • OVARIAN CYSTECTOMY  1993     SOCHX:  reports that she has never smoked. She has never used smokeless tobacco. She reports current alcohol use. She reports that she does not use drugs.     Objective:   /76 (BP Location: Left arm, Patient Position: Sitting, BP Cuff Size: Large adult)   Pulse 77   Temp 36.8 °C (98.3 °F) (Temporal)   Resp 16   Ht 1.689 m (5' 6.5\")   Wt 91.2 kg (201 lb)   SpO2 98%   BMI 31.96 kg/m²     Physical Exam  Constitutional:       General: She is not in acute distress.     Appearance: She is well-developed. She is not diaphoretic.   Eyes:      Extraocular Movements: Extraocular movements intact.      Conjunctiva/sclera: Conjunctivae normal.      Pupils: Pupils are equal, round, and reactive " to light.      Comments: +Mild watery discharge in bilateral eyes, no purulent discharge   Pulmonary:      Effort: Pulmonary effort is normal.   Skin:     Comments: Small 1 cm x 1 cm movable cystlike lump in the left cheek just below the left eyelid, no overlying skin changes, no tenderness in the area, no discharge   Neurological:      Mental Status: She is alert.       Assessment/Plan:   Assessment    1. Allergic conjunctivitis of both eyes  - REFERRAL TO OPHTHALMOLOGY  - olopatadine (PATANOL) 0.1 % ophthalmic solution; Administer 1 Drop into both eyes 2 times a day.  Dispense: 5 mL; Refill: 0    2. Eye lump  - REFERRAL TO OPHTHALMOLOGY  - olopatadine (PATANOL) 0.1 % ophthalmic solution; Administer 1 Drop into both eyes 2 times a day.  Dispense: 5 mL; Refill: 0    Patient with allergic conjunctivitis bilaterally.  She also has a small lump just below the left eyelid.  Unclear exact cause.  It feels more like a subcutaneous cyst rather than skin cyst/infection.  The lump is painless and not growing in size.  Advised warm compresses in the area, do not touch/squeeze the area, and will have follow-up with ophthalmology if the lump is not quickly resolving.  Reviewed close follow-up precautions if the lump should start to become painful or if it should grow in size or have any overlying skin changes.

## 2021-10-11 ENCOUNTER — OFFICE VISIT (OUTPATIENT)
Dept: MEDICAL GROUP | Facility: PHYSICIAN GROUP | Age: 56
End: 2021-10-11
Payer: COMMERCIAL

## 2021-10-11 ENCOUNTER — HOSPITAL ENCOUNTER (OUTPATIENT)
Dept: RADIOLOGY | Facility: MEDICAL CENTER | Age: 56
End: 2021-10-11
Attending: PHYSICIAN ASSISTANT
Payer: COMMERCIAL

## 2021-10-11 ENCOUNTER — PATIENT MESSAGE (OUTPATIENT)
Dept: MEDICAL GROUP | Facility: PHYSICIAN GROUP | Age: 56
End: 2021-10-11

## 2021-10-11 VITALS
SYSTOLIC BLOOD PRESSURE: 120 MMHG | TEMPERATURE: 97.2 F | DIASTOLIC BLOOD PRESSURE: 70 MMHG | HEIGHT: 67 IN | BODY MASS INDEX: 31.39 KG/M2 | WEIGHT: 200 LBS | RESPIRATION RATE: 12 BRPM | HEART RATE: 71 BPM | OXYGEN SATURATION: 98 %

## 2021-10-11 DIAGNOSIS — L98.9 FACIAL LESION: ICD-10-CM

## 2021-10-11 DIAGNOSIS — R22.0 FACIAL MASS: ICD-10-CM

## 2021-10-11 PROCEDURE — 70486 CT MAXILLOFACIAL W/O DYE: CPT

## 2021-10-11 PROCEDURE — 99213 OFFICE O/P EST LOW 20 MIN: CPT | Performed by: PHYSICIAN ASSISTANT

## 2021-10-11 ASSESSMENT — FIBROSIS 4 INDEX: FIB4 SCORE: 0.9

## 2021-10-11 NOTE — PROGRESS NOTES
CC:   Chief Complaint   Patient presents with   • Lump     Under L Eye x 1 week           HISTORY OF PRESENT ILLNESS: Patient is a 56 y.o. female established patient who presents today acute complaint of:     Facial lesion  Lump under left eye started last Tuesday with some swelling. Swelling is worse in the mornings. No aggravating factors or trauma. Using warm compresses and helps swelling. She denies any changes in vision, fevers, headaches. She did have fillers in the cheek in July- Lizbeth Ewing- cosmetic specialist. Has had fillers in past without issues. Saw ophthmology. Negative work up of eye itself.        Patient Active Problem List    Diagnosis Date Noted   • Facial lesion 10/11/2021   • Graves disease 07/19/2021   • Hyperthyroidism 03/05/2021   • Dyslipidemia 03/05/2021   • WBC decreased 03/05/2021   • Menopausal state 02/23/2021   • Mild intermittent asthma without complication 02/23/2021   • Tenderness 06/26/2020   • Headache 01/31/2020   • Bilateral pes planus 06/08/2018   • Gastrocnemius equinus of left lower extremity 06/08/2018   • Gastrocnemius equinus, right 06/08/2018   • Foot pain, right 03/21/2017      Allergies:Aspirin    Current Outpatient Medications   Medication Sig Dispense Refill   • methimazole (TAPAZOLE) 5 MG Tab TAKE 1 TABLET BY MOUTH EVERY DAY 30 Tablet 3   • albuterol 108 (90 Base) MCG/ACT Aero Soln inhalation aerosol Inhale 2 Puffs by mouth every four hours as needed for Shortness of Breath. 1 Inhaler 2     No current facility-administered medications for this visit.       Social History     Tobacco Use   • Smoking status: Never Smoker   • Smokeless tobacco: Never Used   Vaping Use   • Vaping Use: Never used   Substance Use Topics   • Alcohol use: Yes     Comment: 1-2 drinks per month   • Drug use: Never     Social History     Social History Narrative   • Not on file       Family History   Problem Relation Age of Onset   • Cancer Mother         uterine   • Heart Disease Mother    •  "Cancer Father         lymphoma   • Diabetes Maternal great-grandmother        Review of Systems:    Constitutional: No Fevers, Chills  Eyes: No vision changes  ENT: No hearing changes  Resp: No Shortness of breath  CV: No Chest pain  GI: No Nausea/Vomiting  MSK: No weakness  Skin: No rashes  Neuro: No Headaches  Psych: No Suicidal ideations    All remaining systems reviewed and found to be negative, except as stated above.    Exam:    /70   Pulse 71   Temp 36.2 °C (97.2 °F) (Temporal)   Resp 12   Ht 1.689 m (5' 6.5\")   Wt 90.7 kg (200 lb)   SpO2 98%  Body mass index is 31.8 kg/m².    General:  Well nourished, well developed female in NAD  HENT: Atraumatic, normocephalic  EYES: Extraocular movements intact, left eye- non-tender pea sized nodule under the left eye. No significant swelling on exam today. Non-mobile. PERRLA bilaterally.   NECK: Supple, FROM  CHEST: No deformities, Equal chest expansion  RESP: Unlabored, no stridor or audible wheeze  HEART: Regular Rate and rhythm.   Extremities: No Clubbing, Cyanosis, or Edema  Skin: Warm/dry, without rashes  Neuro: A/O x 4, due to COVID-19- did not have patient remove face mask to test cranial nerves.  Motor/sensory grossly intact  Psych: Normal behavior, normal affect      Lab review:  Labs are reviewed and discussed with a patient  Lab Results   Component Value Date/Time    CHOLSTRLTOT 235 (H) 03/02/2021 08:32 AM     (H) 03/02/2021 08:32 AM    HDL 56 03/02/2021 08:32 AM    TRIGLYCERIDE 228 (H) 03/02/2021 08:32 AM       Lab Results   Component Value Date/Time    SODIUM 137 07/19/2021 05:08 PM    POTASSIUM 4.0 07/19/2021 05:08 PM    CHLORIDE 104 07/19/2021 05:08 PM    CO2 22 07/19/2021 05:08 PM    GLUCOSE 95 07/19/2021 05:08 PM    BUN 12 07/19/2021 05:08 PM    CREATININE 0.62 07/19/2021 05:08 PM     Lab Results   Component Value Date/Time    ALKPHOSPHAT 86 07/19/2021 05:08 PM    ASTSGOT 18 07/19/2021 05:08 PM    ALTSGPT 17 07/19/2021 05:08 PM    " TBILIRUBIN 0.3 07/19/2021 05:08 PM      No results found for: HBA1C  No results found for: TSH  Lab Results   Component Value Date/Time    FREET4 0.99 08/27/2021 10:39 AM    FREET4 1.85 (H) 07/19/2021 05:08 PM       Lab Results   Component Value Date/Time    WBC 5.3 07/19/2021 05:08 PM    RBC 4.60 07/19/2021 05:08 PM    HEMOGLOBIN 13.1 07/19/2021 05:08 PM    HEMATOCRIT 40.1 07/19/2021 05:08 PM    MCV 87.2 07/19/2021 05:08 PM    MCH 28.5 07/19/2021 05:08 PM    MCHC 32.7 (L) 07/19/2021 05:08 PM    MPV 9.9 07/19/2021 05:08 PM    NEUTSPOLYS 54.30 07/19/2021 05:08 PM    LYMPHOCYTES 33.10 07/19/2021 05:08 PM    MONOCYTES 9.40 07/19/2021 05:08 PM    EOSINOPHILS 2.80 07/19/2021 05:08 PM    BASOPHILS 0.20 07/19/2021 05:08 PM          Assessment/Plan:  1. Facial lesion  Differential diagnosis would include cyst of some kind, possibly influenced by filler she received in July. Non-tender. No erythema. No swelling on exam today. No eye involvement. No evidence to support infectious process and she had a negative eye exam by ophthomology. Will get imaging to assess further. May need plastics to consult on removal given the location.  Discussed with patient that if she develops any erythema, pain under the eye, vision changes or eye pain she needs to proceed directly to the ED for emergent imaging.  Patient understands and agrees with plan.    Follow-up: Return if symptoms worsen or fail to improve.    Please note that this dictation was created using voice recognition software. I have made every reasonable attempt to correct obvious errors, but I expect that there are errors of grammar and possibly content that I did not discover before finalizing the note.

## 2021-10-11 NOTE — ASSESSMENT & PLAN NOTE
Lump under left eye started last Tuesday with some swelling. Swelling is worse in the mornings. No aggravating factors or trauma. Using warm compresses and helps swelling. She denies any changes in vision, fevers, headaches. She did have fillers in the cheek in July- Lizbeth Ewing- cosmetic specialist. Has had fillers in past without issues. Saw ophthmology. Negative work up of eye itself.

## 2021-10-12 ENCOUNTER — HOSPITAL ENCOUNTER (EMERGENCY)
Facility: MEDICAL CENTER | Age: 56
End: 2021-10-12
Attending: EMERGENCY MEDICINE
Payer: COMMERCIAL

## 2021-10-12 ENCOUNTER — PATIENT MESSAGE (OUTPATIENT)
Dept: MEDICAL GROUP | Facility: PHYSICIAN GROUP | Age: 56
End: 2021-10-12

## 2021-10-12 ENCOUNTER — TELEPHONE (OUTPATIENT)
Dept: MEDICAL GROUP | Facility: PHYSICIAN GROUP | Age: 56
End: 2021-10-12

## 2021-10-12 VITALS
DIASTOLIC BLOOD PRESSURE: 83 MMHG | HEART RATE: 78 BPM | WEIGHT: 202.16 LBS | HEIGHT: 66 IN | TEMPERATURE: 97.4 F | OXYGEN SATURATION: 98 % | RESPIRATION RATE: 16 BRPM | BODY MASS INDEX: 32.49 KG/M2 | SYSTOLIC BLOOD PRESSURE: 157 MMHG

## 2021-10-12 DIAGNOSIS — R22.0 FACIAL MASS: ICD-10-CM

## 2021-10-12 PROCEDURE — 99282 EMERGENCY DEPT VISIT SF MDM: CPT

## 2021-10-12 ASSESSMENT — ENCOUNTER SYMPTOMS
BLURRED VISION: 0
HEADACHES: 0
EYE PAIN: 0
FEVER: 0
DOUBLE VISION: 0
PHOTOPHOBIA: 0
EYE REDNESS: 0
EYE DISCHARGE: 0

## 2021-10-12 ASSESSMENT — FIBROSIS 4 INDEX: FIB4 SCORE: 0.9

## 2021-10-12 NOTE — DISCHARGE INSTRUCTIONS
Tom your primary care doctor note, there is no dermatology the ED call board here at [facility] or at the main hospital on [facility].

## 2021-10-12 NOTE — ED NOTES
Med rec updated and complete  Allergies reviewed.  Pt reports started CEFDINIR 300MG 1 capsule BID on 9/29/2021 for 5 day course, pt did finish course.    No current facility-administered medications on file prior to encounter.     Current Outpatient Medications on File Prior to Encounter   Medication Sig Dispense Refill   • multivitamin (THERAGRAN) Tab Take 2 Tablets by mouth every day.     • Multiple Vitamins-Minerals (ZINC PO) Take 2 Tablets by mouth every day.     • Ascorbic Acid (VITAMIN C PO) Take 1 Tablet by mouth every day.     • VITAMIN E PO Take 1 Capsule by mouth every day.     • methimazole (TAPAZOLE) 5 MG Tab TAKE 1 TABLET BY MOUTH EVERY DAY 30 Tablet 3

## 2021-10-12 NOTE — ED PROVIDER NOTES
ED Provider Note    ED Provider Note    Primary care provider: Amy Crowe P.A.-C.  Means of arrival: POV  History obtained from: patient  History limited by: None    CHIEF COMPLAINT  Chief Complaint   Patient presents with   • Eye Swelling       HPI  Sheron Le is a 56 y.o. female who presents to the Emergency Department on recommendation apparently from her primary care provider, a PA, Amy Crowe to come to the emergency department fora  dermatology because she was unable to get an appointment until January with dermatologist that she was referred to by her PCP.  Patient's had a recent work-up for a nontender, left facial lump.  Initially seen in the urgent care and referred to ophthalmology.  Ophthalmology did a eye exam and told her her eye was fine.  She returned to her PCP who ordered a CT scan, which was done yesterday.  She also refers to her to Rachel's office telling the patient that if the dermatologist could not help her, then the plastic surgeon could that they were in the same office.  Patient also notes that she had cheek fillers done in July.  She was told that she may have a lump or a bump that she could massage out.  It was overall unrevealing.  She denies any systemic symptoms.  She denies any visual changes.  She has no eye pain. She is concerned that it may migrate up to her eye.    REVIEW OF SYSTEMS  Review of Systems   Constitutional: Negative for fever.   Eyes: Negative for blurred vision, double vision, photophobia, pain, discharge and redness.   Skin: Negative for rash.   Neurological: Negative for headaches.       PAST MEDICAL HISTORY   none reported    SURGICAL HISTORY   has a past surgical history that includes ovarian cystectomy (1993).    SOCIAL HISTORY  Social History     Tobacco Use   • Smoking status: Never Smoker   • Smokeless tobacco: Never Used   Vaping Use   • Vaping Use: Never used   Substance Use Topics   • Alcohol use: Not Currently     Comment: 1-2 drinks  "per month   • Drug use: Never      Social History     Substance and Sexual Activity   Drug Use Never       FAMILY HISTORY  Family History   Problem Relation Age of Onset   • Cancer Mother         uterine   • Heart Disease Mother    • Cancer Father         lymphoma   • Diabetes Maternal great-grandmother        CURRENT MEDICATIONS  Home Medications     Reviewed by Aston Garcia (Pharmacy Tech) on 10/12/21 at 1059  Med List Status: Complete   Medication Last Dose Status   Ascorbic Acid (VITAMIN C PO) 10/12/2021 Active   methimazole (TAPAZOLE) 5 MG Tab 10/12/2021 Active   Multiple Vitamins-Minerals (ZINC PO) 10/12/2021 Active   multivitamin (THERAGRAN) Tab 10/12/2021 Active   VITAMIN E PO 10/12/2021 Active                ALLERGIES  Allergies   Allergen Reactions   • Aspirin Swelling     Face swelling       PHYSICAL EXAM  VITAL SIGNS: /83   Pulse 78   Temp 36.3 °C (97.4 °F) (Temporal)   Resp 18   Ht 1.676 m (5' 6\")   Wt 91.7 kg (202 lb 2.6 oz)   SpO2 98%   BMI 32.63 kg/m²   Vitals reviewed.  Constitutional: Patient is oriented to person, place, and time. Appears well-developed and well-nourished. No distress.    Head/Face: Normocephalic and atraumatic.  There is a nontender, soft tissue mass to the left side of the nose, below the eye, the lower eyelid is not swollen or involved.  The mass is approximately 1 cm by half a centimeter.  Nonfluctuant.  No overlying skin changes.  Eyes: Conjunctivae are normal. Pupils are equal, round, and reactive to light.   Neck: Normal range of motion.   Cardiovascular: Normal rate  Pulmonary/Chest: Effort normal. No respiratory distress  Neurological: No focal deficits. CN 2-12 intact  Skin: Skin is warm and dry. No erythema. No pallor.   Psychiatric: Patient has a normal mood and affect. We together, reviewed the radiology    RADIOLOGY  Patient had a CT maxillofacial study without contrast, October 11, 2021, results showed no obvious mass identified within the " limits of this noncontrast head CT.  Orbits intact.  Globes and extraocular muscles are normal in appearance.  Paranasal sinuses noted to be clear.  Facial bones and mandible show no fractures.  No abnormal fluid collections.  The visualized brain and cervical spine visualized are normal.    The radiologist's interpretation of all radiological studies have been reviewed by me.    COURSE & MEDICAL DECISION MAKING  Pertinent Labs & Imaging studies reviewed. (See chart for details)    Obtained and reviewed past medical records.  There is a note from the patient's PCP from yesterday, referring the patient to Dr. Ramos for consultation for a facial mass.  Patient was seen by the PCP yesterday with a lump under the left eye that had been there for a week.  Swelling was noted to be worse in the mornings.  No report of visual changes fevers or headaches.  She has been seen by ophthalmology with a negative eye work-up.  Patient was also seen in the urgent care for an eye problem  October 7.  At this time, she was diagnosed with allergic conjunctivitis of both eyes and an eye lump.  She was referred to ophthalmology at that time.    10:35 AM - Patient seen and examined at bedside.  Report in detail as well as reviewed the images together.  She is frustrated that she cannot see a dermatologist and had been told, that there would be one here.  Unfortunately, we do not have [facility] on-call here [facility]  Unfortunately, apparently her PCP was not aware of this.  I do not think it is appropriate to inject into or try to aspirate this area.  It does not appear to be consistent with abscess or cyst.  There are no overlying skin changes suggestive of cellulitis.  She has no eye involvement.  It is really a left facial mass.  She was referred back to her PCP.      FINAL IMPRESSION  1. Facial mass

## 2021-10-12 NOTE — TELEPHONE ENCOUNTER
----- Message from Amy Crowe P.A.-C. sent at 10/12/2021  8:57 AM PDT -----  Please call patient about their results.     Results showed: negative CT.     Thank you,    Amy Crowe PA-C

## 2021-10-12 NOTE — ED TRIAGE NOTES
Chief Complaint   Patient presents with   • Eye Swelling     Complains of left eye swelling and blurry vision for the past week. Pt sates she had a CT scan that resulted normal. Pt state swelling is worse. Denies injury.     Has this patient been vaccinated for COVID yes

## 2021-11-08 ENCOUNTER — HOSPITAL ENCOUNTER (OUTPATIENT)
Dept: RADIOLOGY | Facility: MEDICAL CENTER | Age: 56
End: 2021-11-08
Payer: COMMERCIAL

## 2021-11-10 ENCOUNTER — HOSPITAL ENCOUNTER (OUTPATIENT)
Dept: RADIOLOGY | Facility: MEDICAL CENTER | Age: 56
End: 2021-11-10
Attending: PHYSICIAN ASSISTANT
Payer: COMMERCIAL

## 2021-11-10 DIAGNOSIS — Z12.31 VISIT FOR SCREENING MAMMOGRAM: ICD-10-CM

## 2021-11-10 PROCEDURE — 77063 BREAST TOMOSYNTHESIS BI: CPT

## 2021-11-17 ENCOUNTER — HOSPITAL ENCOUNTER (OUTPATIENT)
Dept: LAB | Facility: MEDICAL CENTER | Age: 56
End: 2021-11-17
Attending: NURSE PRACTITIONER
Payer: COMMERCIAL

## 2021-11-17 DIAGNOSIS — E05.90 HYPERTHYROIDISM: ICD-10-CM

## 2021-11-17 LAB
T3FREE SERPL-MCNC: 2.72 PG/ML (ref 2–4.4)
T4 FREE SERPL-MCNC: 0.95 NG/DL (ref 0.93–1.7)
TSH SERPL DL<=0.005 MIU/L-ACNC: 4.27 UIU/ML (ref 0.38–5.33)

## 2021-11-17 PROCEDURE — 36415 COLL VENOUS BLD VENIPUNCTURE: CPT

## 2021-11-17 PROCEDURE — 84439 ASSAY OF FREE THYROXINE: CPT

## 2021-11-17 PROCEDURE — 84443 ASSAY THYROID STIM HORMONE: CPT

## 2021-11-17 PROCEDURE — 84481 FREE ASSAY (FT-3): CPT

## 2021-11-19 ENCOUNTER — OFFICE VISIT (OUTPATIENT)
Dept: ENDOCRINOLOGY | Facility: MEDICAL CENTER | Age: 56
End: 2021-11-19
Attending: NURSE PRACTITIONER
Payer: COMMERCIAL

## 2021-11-19 VITALS
OXYGEN SATURATION: 100 % | BODY MASS INDEX: 32.08 KG/M2 | DIASTOLIC BLOOD PRESSURE: 84 MMHG | WEIGHT: 199.6 LBS | SYSTOLIC BLOOD PRESSURE: 120 MMHG | HEIGHT: 66 IN | HEART RATE: 81 BPM

## 2021-11-19 DIAGNOSIS — E05.00 GRAVES DISEASE: ICD-10-CM

## 2021-11-19 DIAGNOSIS — Z79.899 HIGH RISK MEDICATION USE: ICD-10-CM

## 2021-11-19 DIAGNOSIS — E05.90 HYPERTHYROIDISM: ICD-10-CM

## 2021-11-19 PROCEDURE — 99214 OFFICE O/P EST MOD 30 MIN: CPT | Performed by: NURSE PRACTITIONER

## 2021-11-19 PROCEDURE — 99211 OFF/OP EST MAY X REQ PHY/QHP: CPT | Performed by: NURSE PRACTITIONER

## 2021-11-19 ASSESSMENT — FIBROSIS 4 INDEX: FIB4 SCORE: 0.9

## 2021-11-19 NOTE — PROGRESS NOTES
Chief Complaint: Follow up evaluation of Hyperthyroidism secondary to probable mild Graves' disease    HPI:     Sheron Le is a 56 y.o. female for continued evaluation & treatment of the followin. Graves' Disease   Patient reports she's feeling well since her last appointment.   This was incidentally discovered during a routine physical.    Initially discovered to have a low TSH of 0.310 on 2021 during her routine physical.     Thyroid uptake and scan on May 17, 2021 showed increased 5-hour uptake of 46.8% with a diffuse homogeneous pattern of uptake throughout the thyroid gland.     Patient denies palpitations, increased anxiousness and/or heat intolerance.  Patient also denies mental fogginess and constipation. Patient has not noticed severe difference in her symptoms while her TSH level has normalized.  Patient denies enlargement of neck and anterior neck tenderness.     Currently taking Methimazole 5mg daily.     Current Labs:      Ref. Range 2021 11:13   TSH Latest Ref Range: 0.380 - 5.330 uIU/mL 4.270   Free T-4 Latest Ref Range: 0.93 - 1.70 ng/dL 0.95   T3,Free Latest Ref Range: 2.00 - 4.40 pg/mL 2.72        Ref. Range 2021 10:39   TSH Latest Ref Range: 0.380 - 5.330 uIU/mL 0.016 (L)   Free T-4 Latest Ref Range: 0.93 - 1.70 ng/dL 0.99   T3,Free Latest Ref Range: 2.00 - 4.40 pg/mL 3.28       POC US ordered by Dr Frausto has not been scheduled or completed to this date.       Patient's medications, allergies, and social histories were reviewed and updated as appropriate.      ROS:     CONS:     No fever, no chills, no weight loss, no fatigue   EYES:      No diplopia, no blurry vision, no redness of eyes, no swelling of eyelids   ENT:    No hearing loss, No ear pain, No sore throat, no dysphagia, no neck swelling   CV:     No chest pain, no palpitations, no claudication, no orthopnea, no PND   PULM:    No SOB, no cough, no hemoptysis, no wheezing    GI:   No nausea, no vomiting,  no diarrhea, no constipation, no bloody stools   :  Passing urine well, no dysuria, no hematuria   ENDO:   No polyuria, no polydipsia, no heat intolerance, no cold intolerance   NEURO: No headaches, no dizziness, no convulsions, no tremors   MUSC:  No joint swellings, no arthralgias, no myalgias, no weakness   SKIN:   No rash, no ulcers, no dry skin   PSYCH:   No depression, no anxiety, no difficulty sleeping       Past Medical History:  Patient Active Problem List    Diagnosis Date Noted   • Facial lesion 10/11/2021   • Graves disease 07/19/2021   • Hyperthyroidism 03/05/2021   • Dyslipidemia 03/05/2021   • WBC decreased 03/05/2021   • Menopausal state 02/23/2021   • Mild intermittent asthma without complication 02/23/2021   • Tenderness 06/26/2020   • Headache 01/31/2020   • Bilateral pes planus 06/08/2018   • Gastrocnemius equinus of left lower extremity 06/08/2018   • Gastrocnemius equinus, right 06/08/2018   • Foot pain, right 03/21/2017       Past Surgical History:  Past Surgical History:   Procedure Laterality Date   • OVARIAN CYSTECTOMY  1993        Allergies:  Aspirin     Current Medications:    Current Outpatient Medications:   •  multivitamin (THERAGRAN) Tab, Take 2 Tablets by mouth every day., Disp: , Rfl:   •  Multiple Vitamins-Minerals (ZINC PO), Take 2 Tablets by mouth every day., Disp: , Rfl:   •  Ascorbic Acid (VITAMIN C PO), Take 1 Tablet by mouth every day., Disp: , Rfl:   •  VITAMIN E PO, Take 1 Capsule by mouth every day., Disp: , Rfl:   •  methimazole (TAPAZOLE) 5 MG Tab, TAKE 1 TABLET BY MOUTH EVERY DAY, Disp: 30 Tablet, Rfl: 3    Social History:  Social History     Socioeconomic History   • Marital status:      Spouse name: Not on file   • Number of children: Not on file   • Years of education: Not on file   • Highest education level: Not on file   Occupational History   • Not on file   Tobacco Use   • Smoking status: Never Smoker   • Smokeless tobacco: Never Used   Vaping Use   •  "Vaping Use: Never used   Substance and Sexual Activity   • Alcohol use: Not Currently     Comment: 1-2 drinks per month   • Drug use: Never   • Sexual activity: Yes     Partners: Male     Birth control/protection: Post-Menopausal, None   Other Topics Concern   • Not on file   Social History Narrative   • Not on file     Social Determinants of Health     Financial Resource Strain:    • Difficulty of Paying Living Expenses: Not on file   Food Insecurity:    • Worried About Running Out of Food in the Last Year: Not on file   • Ran Out of Food in the Last Year: Not on file   Transportation Needs:    • Lack of Transportation (Medical): Not on file   • Lack of Transportation (Non-Medical): Not on file   Physical Activity:    • Days of Exercise per Week: Not on file   • Minutes of Exercise per Session: Not on file   Stress:    • Feeling of Stress : Not on file   Social Connections:    • Frequency of Communication with Friends and Family: Not on file   • Frequency of Social Gatherings with Friends and Family: Not on file   • Attends Temple Services: Not on file   • Active Member of Clubs or Organizations: Not on file   • Attends Club or Organization Meetings: Not on file   • Marital Status: Not on file   Intimate Partner Violence:    • Fear of Current or Ex-Partner: Not on file   • Emotionally Abused: Not on file   • Physically Abused: Not on file   • Sexually Abused: Not on file   Housing Stability:    • Unable to Pay for Housing in the Last Year: Not on file   • Number of Places Lived in the Last Year: Not on file   • Unstable Housing in the Last Year: Not on file        Family History:   Family History   Problem Relation Age of Onset   • Cancer Mother         uterine   • Heart Disease Mother    • Cancer Father         lymphoma   • Diabetes Maternal great-grandmother          PHYSICAL EXAM:   Vital signs: /84 (BP Location: Left arm, Patient Position: Sitting, BP Cuff Size: Adult)   Pulse 81   Ht 1.676 m (5' 6\") "   Wt 90.5 kg (199 lb 9.6 oz)   SpO2 100%   BMI 32.22 kg/m²   GENERAL: Well-developed, well-nourished  in no apparent distress.   EYE: No ocular and eyelid asymmetry, Anicteric sclerae,  PERRL, No exophthalmos or lidlag  HENT: Hearing grossly intact, Normocephalic, atraumatic. Pink, moist mucous membranes, No exudate  NECK: Supple. Trachea midline. thyroid is normal in size without nodules or tenderness  CARDIOVASCULAR: Regular rate and rhythm. No murmurs, rubs, or gallops.   LUNGS: Clear to auscultation bilaterally   ABDOMEN: Soft, nontender with positive bowel sounds.   EXTREMITIES: No clubbing, cyanosis, or edema.   NEUROLOGICAL: Cranial nerves II-XII are grossly intact   Symmetric reflexes at the patella no proximal muscle weakness, No visible tremor with both outstretched hands  LYMPH: No cervical, supraclavicular,  adenopathy palpated.   SKIN: No rashes, lesions. Turgor is normal.    ASSESSMENT/PLAN:     1. Hyperthyroidism  Stable.   TSH continues to recover and currently patient is in a euthyroid state.  Recommend decreasing methimazole to 2.5 mg daily.    Recommend repeating thyroid function panel in 3 months before next appointment.    2. Graves disease  Stable.   This is the likely etiology of her hyperthyroidism as proven by her abnormal thyroid uptake and scan    3. High risk medication use  Patient is likely to start methimazole which is a high risk medication I reviewed the side effects of this medication      Complete labs 1-2 weeks before next appointment.   Next appointment in 3 monhts.     Thank you kindly for allowing me to participate in the thyroid care plan for this patient.    Yi Floyd, APRN  11/19/2021    CC:   Amy Crowe P.A.-C.

## 2021-12-06 DIAGNOSIS — E05.90 HYPERTHYROIDISM: ICD-10-CM

## 2021-12-06 RX ORDER — METHIMAZOLE 5 MG/1
5 TABLET ORAL DAILY
Qty: 90 TABLET | Refills: 1 | Status: SHIPPED | OUTPATIENT
Start: 2021-12-06 | End: 2022-07-20 | Stop reason: SDUPTHER

## 2021-12-15 ENCOUNTER — PATIENT MESSAGE (OUTPATIENT)
Dept: MEDICAL GROUP | Facility: PHYSICIAN GROUP | Age: 56
End: 2021-12-15

## 2021-12-16 ENCOUNTER — TELEMEDICINE (OUTPATIENT)
Dept: MEDICAL GROUP | Facility: PHYSICIAN GROUP | Age: 56
End: 2021-12-16
Payer: COMMERCIAL

## 2021-12-16 VITALS — WEIGHT: 188 LBS | HEIGHT: 66 IN | RESPIRATION RATE: 16 BRPM | BODY MASS INDEX: 30.22 KG/M2

## 2021-12-16 DIAGNOSIS — R51.9 NONINTRACTABLE HEADACHE, UNSPECIFIED CHRONICITY PATTERN, UNSPECIFIED HEADACHE TYPE: ICD-10-CM

## 2021-12-16 PROCEDURE — 99213 OFFICE O/P EST LOW 20 MIN: CPT | Mod: 95 | Performed by: PHYSICIAN ASSISTANT

## 2021-12-16 ASSESSMENT — FIBROSIS 4 INDEX: FIB4 SCORE: 0.9

## 2021-12-16 NOTE — PROGRESS NOTES
Virtual Visit: Established Patient   This visit was conducted via Zoom using secure and encrypted videoconferencing technology. The patient was in a private location in the state of [place].    The patient's identity was confirmed and verbal consent was obtained for this virtual visit.    Subjective:   CC:   Chief Complaint   Patient presents with   • Headache       Sheron Le is a 56 y.o. female presenting for evaluation and management of:    Patient complains of that 5 days ago she had headache, lasted an hour, had episode of nausea and vomiting.   Wednesday she had recurrent headache with vomiting shortly after. Headache did not resolve until this morning.   Headache described as pounding, pulsatile generalized headache. She took extra strength tylenol no benefit. Denies any vision or hearing changes.       ROS   Denies any recent fevers or chills. No nausea or vomiting. No chest pains or shortness of breath.     Allergies   Allergen Reactions   • Aspirin Swelling     Face swelling       Current medicines (including changes today)  Current Outpatient Medications   Medication Sig Dispense Refill   • methimazole (TAPAZOLE) 5 MG Tab TAKE 1 TABLET BY MOUTH EVERY DAY 90 Tablet 1   • multivitamin (THERAGRAN) Tab Take 2 Tablets by mouth every day.     • Multiple Vitamins-Minerals (ZINC PO) Take 2 Tablets by mouth every day.     • Ascorbic Acid (VITAMIN C PO) Take 1 Tablet by mouth every day.     • VITAMIN E PO Take 1 Capsule by mouth every day.       No current facility-administered medications for this visit.       Patient Active Problem List    Diagnosis Date Noted   • Facial lesion 10/11/2021   • Graves disease 07/19/2021   • Hyperthyroidism 03/05/2021   • Dyslipidemia 03/05/2021   • WBC decreased 03/05/2021   • Menopausal state 02/23/2021   • Mild intermittent asthma without complication 02/23/2021   • Tenderness 06/26/2020   • Headache 01/31/2020   • Bilateral pes planus 06/08/2018   • Gastrocnemius equinus  "of left lower extremity 06/08/2018   • Gastrocnemius equinus, right 06/08/2018   • Foot pain, right 03/21/2017       Family History   Problem Relation Age of Onset   • Cancer Mother         uterine   • Heart Disease Mother    • Cancer Father         lymphoma   • Diabetes Maternal great-grandmother        She  has no past medical history on file.  She  has a past surgical history that includes ovarian cystectomy (1993).       Objective:   Resp 16   Ht 1.676 m (5' 6\") Comment: per pt  Wt 85.3 kg (188 lb) Comment: per pt  BMI 30.34 kg/m²     Physical Exam:  Constitutional: Alert, no distress, well-groomed.  Skin: No rashes in visible areas.  Eye: Round. Conjunctiva clear, lids normal. No icterus.   ENMT: Lips pink without lesions, good dentition, moist mucous membranes. Phonation normal.  Neck: No masses, no thyromegaly. Moves freely without pain.  Respiratory: Unlabored respiratory effort, no cough or audible wheeze  Psych: Alert and oriented x3, normal affect and mood.       Assessment and Plan:   The following treatment plan was discussed:     1. Nonintractable headache, unspecified chronicity pattern, unspecified headache type     Suspect migraine type headache. New onset, has not had headaches like this before. Denies any vision changes, hearing changes, speech or memory issues, no numbness or tingling or weakness. Visually neurologically intact on exam today.     Being that this is new onset headaches, recommend baseline MRI, I do believe she would benefit from a triptan I would like to get baseline MRI first.     After further discussion, it seems as though weather change may have been trigger, discussed other triggers that could affect her headaches. She is taking tylenol as needed, she has not taken any NSAIDs because she is allergic to aspirin.     Follow-up: Return in about 2 weeks (around 12/30/2021) for Follow up imaging. .         The patient verbalized agreement and understanding of current plan. All " questions and concerns were addressed at time of visit.    Please note that this dictation was created using voice recognition software. I have made every reasonable attempt to correct obvious errors, but I expect that there are errors of grammar and possibly content that I did not discover before finalizing the note.

## 2021-12-27 ENCOUNTER — HOSPITAL ENCOUNTER (OUTPATIENT)
Dept: RADIOLOGY | Facility: MEDICAL CENTER | Age: 56
End: 2021-12-27
Attending: PHYSICIAN ASSISTANT
Payer: COMMERCIAL

## 2021-12-27 DIAGNOSIS — R51.9 NONINTRACTABLE HEADACHE, UNSPECIFIED CHRONICITY PATTERN, UNSPECIFIED HEADACHE TYPE: ICD-10-CM

## 2021-12-27 PROCEDURE — 70553 MRI BRAIN STEM W/O & W/DYE: CPT

## 2021-12-27 PROCEDURE — 700117 HCHG RX CONTRAST REV CODE 255: Performed by: PHYSICIAN ASSISTANT

## 2021-12-27 PROCEDURE — A9576 INJ PROHANCE MULTIPACK: HCPCS | Performed by: PHYSICIAN ASSISTANT

## 2021-12-27 RX ADMIN — GADOTERIDOL 18 ML: 279.3 INJECTION, SOLUTION INTRAVENOUS at 13:06

## 2022-01-03 ENCOUNTER — APPOINTMENT (OUTPATIENT)
Dept: RADIOLOGY | Facility: MEDICAL CENTER | Age: 57
End: 2022-01-03
Attending: PHYSICIAN ASSISTANT
Payer: COMMERCIAL

## 2022-01-12 ENCOUNTER — OFFICE VISIT (OUTPATIENT)
Dept: MEDICAL GROUP | Facility: PHYSICIAN GROUP | Age: 57
End: 2022-01-12
Payer: COMMERCIAL

## 2022-01-12 ENCOUNTER — HOSPITAL ENCOUNTER (OUTPATIENT)
Dept: LAB | Facility: MEDICAL CENTER | Age: 57
End: 2022-01-12
Attending: INTERNAL MEDICINE
Payer: COMMERCIAL

## 2022-01-12 VITALS
HEART RATE: 74 BPM | WEIGHT: 187 LBS | SYSTOLIC BLOOD PRESSURE: 124 MMHG | BODY MASS INDEX: 30.05 KG/M2 | TEMPERATURE: 98.7 F | OXYGEN SATURATION: 96 % | DIASTOLIC BLOOD PRESSURE: 82 MMHG | HEIGHT: 66 IN | RESPIRATION RATE: 14 BRPM

## 2022-01-12 DIAGNOSIS — Z13.0 SCREENING FOR DEFICIENCY ANEMIA: ICD-10-CM

## 2022-01-12 DIAGNOSIS — E78.2 MIXED HYPERLIPIDEMIA: ICD-10-CM

## 2022-01-12 DIAGNOSIS — E05.90 HYPERTHYROIDISM: ICD-10-CM

## 2022-01-12 DIAGNOSIS — G44.209 ACUTE NON INTRACTABLE TENSION-TYPE HEADACHE: ICD-10-CM

## 2022-01-12 DIAGNOSIS — E05.00 GRAVES DISEASE: ICD-10-CM

## 2022-01-12 LAB
ALBUMIN SERPL BCP-MCNC: 4.5 G/DL (ref 3.2–4.9)
ALBUMIN/GLOB SERPL: 1.7 G/DL
ALP SERPL-CCNC: 94 U/L (ref 30–99)
ALT SERPL-CCNC: 15 U/L (ref 2–50)
ANION GAP SERPL CALC-SCNC: 11 MMOL/L (ref 7–16)
AST SERPL-CCNC: 15 U/L (ref 12–45)
BASOPHILS # BLD AUTO: 0.6 % (ref 0–1.8)
BASOPHILS # BLD: 0.03 K/UL (ref 0–0.12)
BILIRUB SERPL-MCNC: 0.4 MG/DL (ref 0.1–1.5)
BUN SERPL-MCNC: 9 MG/DL (ref 8–22)
CALCIUM SERPL-MCNC: 9.6 MG/DL (ref 8.5–10.5)
CHLORIDE SERPL-SCNC: 104 MMOL/L (ref 96–112)
CHOLEST SERPL-MCNC: 217 MG/DL (ref 100–199)
CO2 SERPL-SCNC: 25 MMOL/L (ref 20–33)
CREAT SERPL-MCNC: 0.63 MG/DL (ref 0.5–1.4)
EOSINOPHIL # BLD AUTO: 0.15 K/UL (ref 0–0.51)
EOSINOPHIL NFR BLD: 3 % (ref 0–6.9)
ERYTHROCYTE [DISTWIDTH] IN BLOOD BY AUTOMATED COUNT: 42.1 FL (ref 35.9–50)
FASTING STATUS PATIENT QL REPORTED: NORMAL
GLOBULIN SER CALC-MCNC: 2.6 G/DL (ref 1.9–3.5)
GLUCOSE SERPL-MCNC: 94 MG/DL (ref 65–99)
HCT VFR BLD AUTO: 40.6 % (ref 37–47)
HDLC SERPL-MCNC: 63 MG/DL
HGB BLD-MCNC: 13.7 G/DL (ref 12–16)
IMM GRANULOCYTES # BLD AUTO: 0.01 K/UL (ref 0–0.11)
IMM GRANULOCYTES NFR BLD AUTO: 0.2 % (ref 0–0.9)
LDLC SERPL CALC-MCNC: 125 MG/DL
LYMPHOCYTES # BLD AUTO: 1.74 K/UL (ref 1–4.8)
LYMPHOCYTES NFR BLD: 34.5 % (ref 22–41)
MCH RBC QN AUTO: 30 PG (ref 27–33)
MCHC RBC AUTO-ENTMCNC: 33.7 G/DL (ref 33.6–35)
MCV RBC AUTO: 88.8 FL (ref 81.4–97.8)
MONOCYTES # BLD AUTO: 0.41 K/UL (ref 0–0.85)
MONOCYTES NFR BLD AUTO: 8.1 % (ref 0–13.4)
NEUTROPHILS # BLD AUTO: 2.71 K/UL (ref 2–7.15)
NEUTROPHILS NFR BLD: 53.6 % (ref 44–72)
NRBC # BLD AUTO: 0 K/UL
NRBC BLD-RTO: 0 /100 WBC
PLATELET # BLD AUTO: 251 K/UL (ref 164–446)
PMV BLD AUTO: 10.5 FL (ref 9–12.9)
POTASSIUM SERPL-SCNC: 4.5 MMOL/L (ref 3.6–5.5)
PROT SERPL-MCNC: 7.1 G/DL (ref 6–8.2)
RBC # BLD AUTO: 4.57 M/UL (ref 4.2–5.4)
SODIUM SERPL-SCNC: 140 MMOL/L (ref 135–145)
TRIGL SERPL-MCNC: 144 MG/DL (ref 0–149)
WBC # BLD AUTO: 5.1 K/UL (ref 4.8–10.8)

## 2022-01-12 PROCEDURE — 80053 COMPREHEN METABOLIC PANEL: CPT

## 2022-01-12 PROCEDURE — 99214 OFFICE O/P EST MOD 30 MIN: CPT | Performed by: INTERNAL MEDICINE

## 2022-01-12 PROCEDURE — 80061 LIPID PANEL: CPT

## 2022-01-12 PROCEDURE — 36415 COLL VENOUS BLD VENIPUNCTURE: CPT

## 2022-01-12 PROCEDURE — 85025 COMPLETE CBC W/AUTO DIFF WBC: CPT

## 2022-01-12 RX ORDER — RIZATRIPTAN BENZOATE 5 MG/1
5 TABLET ORAL
Qty: 10 TABLET | Refills: 3 | Status: SHIPPED | OUTPATIENT
Start: 2022-01-12

## 2022-01-12 ASSESSMENT — PATIENT HEALTH QUESTIONNAIRE - PHQ9: CLINICAL INTERPRETATION OF PHQ2 SCORE: 0

## 2022-01-12 ASSESSMENT — FIBROSIS 4 INDEX: FIB4 SCORE: 0.9

## 2022-02-14 ENCOUNTER — PATIENT MESSAGE (OUTPATIENT)
Dept: MEDICAL GROUP | Facility: PHYSICIAN GROUP | Age: 57
End: 2022-02-14

## 2022-02-15 RX ORDER — ALBUTEROL SULFATE 90 UG/1
1 AEROSOL, METERED RESPIRATORY (INHALATION) EVERY 6 HOURS PRN
Qty: 6.7 G | Refills: 2 | Status: SHIPPED | OUTPATIENT
Start: 2022-02-15 | End: 2023-10-09 | Stop reason: SDUPTHER

## 2022-03-07 ENCOUNTER — HOSPITAL ENCOUNTER (OUTPATIENT)
Dept: LAB | Facility: MEDICAL CENTER | Age: 57
End: 2022-03-07
Attending: NURSE PRACTITIONER
Payer: COMMERCIAL

## 2022-03-07 DIAGNOSIS — E05.90 HYPERTHYROIDISM: ICD-10-CM

## 2022-03-07 LAB
T3FREE SERPL-MCNC: 2.88 PG/ML (ref 2–4.4)
T4 FREE SERPL-MCNC: 1.05 NG/DL (ref 0.93–1.7)
TSH SERPL DL<=0.005 MIU/L-ACNC: 2.07 UIU/ML (ref 0.38–5.33)

## 2022-03-07 PROCEDURE — 36415 COLL VENOUS BLD VENIPUNCTURE: CPT

## 2022-03-07 PROCEDURE — 84481 FREE ASSAY (FT-3): CPT

## 2022-03-07 PROCEDURE — 84439 ASSAY OF FREE THYROXINE: CPT

## 2022-03-07 PROCEDURE — 84443 ASSAY THYROID STIM HORMONE: CPT

## 2022-03-11 ENCOUNTER — OFFICE VISIT (OUTPATIENT)
Dept: ENDOCRINOLOGY | Facility: MEDICAL CENTER | Age: 57
End: 2022-03-11
Attending: NURSE PRACTITIONER
Payer: COMMERCIAL

## 2022-03-11 VITALS
DIASTOLIC BLOOD PRESSURE: 90 MMHG | HEART RATE: 76 BPM | SYSTOLIC BLOOD PRESSURE: 138 MMHG | OXYGEN SATURATION: 95 % | WEIGHT: 206.2 LBS | BODY MASS INDEX: 33.14 KG/M2 | HEIGHT: 66 IN

## 2022-03-11 DIAGNOSIS — E05.00 GRAVES DISEASE: ICD-10-CM

## 2022-03-11 DIAGNOSIS — Z79.899 HIGH RISK MEDICATION USE: ICD-10-CM

## 2022-03-11 DIAGNOSIS — E55.9 VITAMIN D DEFICIENCY: ICD-10-CM

## 2022-03-11 DIAGNOSIS — E05.90 HYPERTHYROIDISM: ICD-10-CM

## 2022-03-11 PROCEDURE — 99214 OFFICE O/P EST MOD 30 MIN: CPT | Performed by: NURSE PRACTITIONER

## 2022-03-11 PROCEDURE — 99211 OFF/OP EST MAY X REQ PHY/QHP: CPT | Performed by: NURSE PRACTITIONER

## 2022-03-11 ASSESSMENT — FIBROSIS 4 INDEX: FIB4 SCORE: 0.85

## 2022-03-11 NOTE — PROGRESS NOTES
Chief Complaint: Follow up evaluation of Hyperthyroidism secondary to probable mild Graves' disease    HPI:     Sheron Le is a very pleasant 56 y.o. female for continued evaluation & treatment of the followin. Graves' Disease   Patient reports overall she is feeling well since her last appointment.  No new issues or concerns at this time.  This was incidentally discovered during a routine physical.    Initially discovered to have a low TSH of 0.310 on 2021 during her routine physical.     Thyroid uptake and scan on May 17, 2021 showed increased 5-hour uptake of 46.8% with a diffuse homogeneous pattern of uptake throughout the thyroid gland.     Patient denies palpitations, increased anxiousness and/or heat intolerance.  Patient also denies mental fogginess and constipation. Patient has not noticed severe difference in her symptoms while her TSH level has normalized.  Patient denies enlargement of neck and anterior neck tenderness.     Currently taking Methimazole 2.5mg daily.  Thyroid gland continues to recover and has been stable for over 4 months now with thyroid function biochemically euthyroid.    Current Labs:      Ref. Range 3/7/2022 10:55   TSH Latest Ref Range: 0.380 - 5.330 uIU/mL 2.070   Free T-4 Latest Ref Range: 0.93 - 1.70 ng/dL 1.05   T3,Free Latest Ref Range: 2.00 - 4.40 pg/mL 2.88       POC US ordered by Dr Frausto has not been scheduled or completed to this date.       Vitamin D deficiency.  Patient currently taking vitamin D 5000 IU daily.  No current vitamin D level assay to review.     Ref. Range 3/2/2021 08:32   25-Hydroxy   Vitamin D 25 Latest Ref Range: 30 - 100 ng/mL 40       Patient's medications, allergies, and social histories were reviewed and updated as appropriate.      ROS:     CONS:     No fever, no chills, no weight loss, no fatigue   EYES:      No diplopia, no blurry vision, no redness of eyes, no swelling of eyelids   ENT:    No hearing loss, No ear pain, No  sore throat, no dysphagia, no neck swelling   CV:     No chest pain, no palpitations, no claudication, no orthopnea, no PND   PULM:    No SOB, no cough, no hemoptysis, no wheezing    GI:   No nausea, no vomiting, no diarrhea, no constipation, no bloody stools   :  Passing urine well, no dysuria, no hematuria   ENDO:   No polyuria, no polydipsia, no heat intolerance, no cold intolerance   NEURO: No headaches, no dizziness, no convulsions, no tremors   MUSC:  No joint swellings, no arthralgias, no myalgias, no weakness   SKIN:   No rash, no ulcers, no dry skin   PSYCH:   No depression, no anxiety, no difficulty sleeping       Past Medical History:  Patient Active Problem List    Diagnosis Date Noted   • Facial lesion 10/11/2021   • Graves disease 07/19/2021   • Hyperthyroidism 03/05/2021   • Mixed hyperlipidemia 03/05/2021   • WBC decreased 03/05/2021   • Menopausal state 02/23/2021   • Mild intermittent asthma without complication 02/23/2021   • Tenderness 06/26/2020   • Headache 01/31/2020   • Bilateral pes planus 06/08/2018   • Gastrocnemius equinus of left lower extremity 06/08/2018   • Gastrocnemius equinus, right 06/08/2018   • Foot pain, right 03/21/2017       Past Surgical History:  Past Surgical History:   Procedure Laterality Date   • OVARIAN CYSTECTOMY  1993        Allergies:  Aspirin     Current Medications:    Current Outpatient Medications:   •  albuterol 108 (90 Base) MCG/ACT Aero Soln inhalation aerosol, Inhale 1 Puff every 6 hours as needed for Shortness of Breath., Disp: 6.7 g, Rfl: 2  •  Cholecalciferol (VITAMIN D3) 125 MCG (5000 UT) Tab, Take  by mouth., Disp: , Rfl:   •  rizatriptan (MAXALT) 5 MG tablet, Take 1 Tablet by mouth one time as needed for Migraine for up to 1 dose. May repeat dose once, one hour after initial dose., Disp: 10 Tablet, Rfl: 3  •  methimazole (TAPAZOLE) 5 MG Tab, TAKE 1 TABLET BY MOUTH EVERY DAY, Disp: 90 Tablet, Rfl: 1  •  Multiple Vitamins-Minerals (ZINC PO), Take 2  "Tablets by mouth every day., Disp: , Rfl:   •  Ascorbic Acid (VITAMIN C PO), Take 1 Tablet by mouth every day., Disp: , Rfl:     Social History:  Social History     Socioeconomic History   • Marital status:      Spouse name: Not on file   • Number of children: Not on file   • Years of education: Not on file   • Highest education level: Not on file   Occupational History   • Not on file   Tobacco Use   • Smoking status: Never Smoker   • Smokeless tobacco: Never Used   Vaping Use   • Vaping Use: Never used   Substance and Sexual Activity   • Alcohol use: Yes     Comment: 1-2 drinks per month   • Drug use: Never   • Sexual activity: Yes     Partners: Male     Birth control/protection: Post-Menopausal, None   Other Topics Concern   • Not on file   Social History Narrative   • Not on file     Social Determinants of Health     Financial Resource Strain: Not on file   Food Insecurity: Not on file   Transportation Needs: Not on file   Physical Activity: Not on file   Stress: Not on file   Social Connections: Not on file   Intimate Partner Violence: Not on file   Housing Stability: Not on file        Family History:   Family History   Problem Relation Age of Onset   • Cancer Mother         uterine   • Heart Disease Mother    • Cancer Father         lymphoma   • Diabetes Maternal great-grandmother          PHYSICAL EXAM:   Vital signs: /90 (BP Location: Right arm, Patient Position: Sitting, BP Cuff Size: Adult)   Pulse 76   Ht 1.676 m (5' 6\")   Wt 93.5 kg (206 lb 3.2 oz)   SpO2 95%   BMI 33.28 kg/m²   GENERAL: Well-developed, well-nourished  in no apparent distress.   EYE: No ocular and eyelid asymmetry, Anicteric sclerae,  PERRL, No exophthalmos or lidlag  HENT: Hearing grossly intact, Normocephalic, atraumatic. Pink, moist mucous membranes, No exudate  NECK: Supple. Trachea midline. thyroid is normal in size without nodules or tenderness  CARDIOVASCULAR: Regular rate and rhythm. No murmurs, rubs, or " gallops.   LUNGS: Clear to auscultation bilaterally   ABDOMEN: Soft, nontender with positive bowel sounds.   EXTREMITIES: No clubbing, cyanosis, or edema.   NEUROLOGICAL: Cranial nerves II-XII are grossly intact   Symmetric reflexes at the patella no proximal muscle weakness, No visible tremor with both outstretched hands  LYMPH: No cervical, supraclavicular,  adenopathy palpated.   SKIN: No rashes, lesions. Turgor is normal.    ASSESSMENT/PLAN:     1. Hyperthyroidism  Stable.   TSH continues to recover and currently patient is in a euthyroid state.  Recommend decreasing methimazole to 2.5 mg every other day.    Recommend repeating thyroid function panel in 3 months before next appointment.    2. Graves disease  Stable.   This is the likely etiology of her hyperthyroidism as proven by her abnormal thyroid uptake and scan.       3. High risk medication use  Patient is likely to start methimazole which is a high risk medication I reviewed the side effects of this medication    4.  Vitamin D deficiency  Unstable.  Continue taking vitamin D 5000 IU daily.    Complete labs 1-2 weeks before next appointment.   Next appointment in 3 monhts.     Thank you kindly for allowing me to participate in the thyroid care plan for this patient.    Yi Floyd, APRN  03/10/2022    CC:   Amy Crowe P.A.-C.

## 2022-04-18 ENCOUNTER — RESEARCH ENCOUNTER (OUTPATIENT)
Dept: MEDICAL GROUP | Facility: PHYSICIAN GROUP | Age: 57
End: 2022-04-18
Payer: COMMERCIAL

## 2022-04-18 DIAGNOSIS — Z00.6 RESEARCH STUDY PATIENT: ICD-10-CM

## 2022-04-18 NOTE — RESEARCH NOTE
[facility] [place] [facility] enrollment complete. Saliva sample collected onsite. Patient enrolled in [facility].

## 2022-05-15 LAB
APOB+LDLR+PCSK9 GENE MUT ANL BLD/T: NOT DETECTED
BRCA1+BRCA2 DEL+DUP + FULL MUT ANL BLD/T: NOT DETECTED
MLH1+MSH2+MSH6+PMS2 GN DEL+DUP+FUL M: NOT DETECTED

## 2022-07-15 ENCOUNTER — HOSPITAL ENCOUNTER (OUTPATIENT)
Dept: LAB | Facility: MEDICAL CENTER | Age: 57
End: 2022-07-15
Attending: NURSE PRACTITIONER
Payer: COMMERCIAL

## 2022-07-15 DIAGNOSIS — E05.90 HYPERTHYROIDISM: ICD-10-CM

## 2022-07-15 LAB
T3FREE SERPL-MCNC: 2.48 PG/ML (ref 2–4.4)
T4 FREE SERPL-MCNC: 0.97 NG/DL (ref 0.93–1.7)
TSH SERPL DL<=0.005 MIU/L-ACNC: 2.78 UIU/ML (ref 0.38–5.33)

## 2022-07-15 PROCEDURE — 84481 FREE ASSAY (FT-3): CPT

## 2022-07-15 PROCEDURE — 36415 COLL VENOUS BLD VENIPUNCTURE: CPT

## 2022-07-15 PROCEDURE — 84443 ASSAY THYROID STIM HORMONE: CPT

## 2022-07-15 PROCEDURE — 84439 ASSAY OF FREE THYROXINE: CPT

## 2022-07-20 ENCOUNTER — OFFICE VISIT (OUTPATIENT)
Dept: ENDOCRINOLOGY | Facility: MEDICAL CENTER | Age: 57
End: 2022-07-20
Attending: INTERNAL MEDICINE
Payer: COMMERCIAL

## 2022-07-20 VITALS
SYSTOLIC BLOOD PRESSURE: 120 MMHG | WEIGHT: 210 LBS | HEIGHT: 66 IN | DIASTOLIC BLOOD PRESSURE: 72 MMHG | BODY MASS INDEX: 33.75 KG/M2 | OXYGEN SATURATION: 96 % | HEART RATE: 81 BPM

## 2022-07-20 DIAGNOSIS — E55.9 VITAMIN D DEFICIENCY: ICD-10-CM

## 2022-07-20 DIAGNOSIS — E05.90 HYPERTHYROIDISM: ICD-10-CM

## 2022-07-20 DIAGNOSIS — E05.00 GRAVES DISEASE: ICD-10-CM

## 2022-07-20 DIAGNOSIS — Z79.899 HIGH RISK MEDICATION USE: ICD-10-CM

## 2022-07-20 PROCEDURE — 99211 OFF/OP EST MAY X REQ PHY/QHP: CPT | Performed by: INTERNAL MEDICINE

## 2022-07-20 PROCEDURE — 99214 OFFICE O/P EST MOD 30 MIN: CPT | Performed by: INTERNAL MEDICINE

## 2022-07-20 RX ORDER — METHIMAZOLE 5 MG/1
5 TABLET ORAL DAILY
Qty: 90 TABLET | Refills: 1 | Status: SHIPPED | OUTPATIENT
Start: 2022-07-20 | End: 2023-01-24 | Stop reason: SDUPTHER

## 2022-07-20 ASSESSMENT — FIBROSIS 4 INDEX: FIB4 SCORE: 0.86

## 2022-07-20 NOTE — PROGRESS NOTES
Chief Complaint: Follow up for Hyperthyroidism secondary to Grave's disease    HPI:     Sheron Le is a 57 y.o. female here for follow up of Hyperthyroidism.      She was previously seen by the nurse practitioner and is my first time meeting her    She was diagnosed with Graves' hyperthyroidism in July 2021.  Her baseline TSI and thyrotropin receptor antibodies were positive    She has not had a thyroid ultrasound but is scheduled for an ultrasound in the office next week      She was supposed to take methimazole 2.5 mg every other day but she did not feel good and she went up on her medication to    5 mg daily alternating with 2.5 mg daily    She thought that this would help but actually it did not as her TSH went up from 2.0 to 2.7 on July 15, 2022    Her free T4 and free T3 levels have remained within normal limits      Aside from fatigue she does not have any thyrotoxic symptoms such as palpitations, tremors and insomnia  She denies symptoms of thyroid eye disease such as eye pain and eye redness.          Patient's medications, allergies, and social histories were reviewed and updated as appropriate.      ROS:     CONS:     No fever, no chills   EYES:     No diplopia, no blurry vision   CV:           No chest pain, no palpitations   PULM:     No SOB, no cough, no hemoptysis.   GI:            No nausea, no vomiting, no diarrhea, no constipation   ENDO:     No polyuria, no polydipsia, no heat intolerance, no cold intolerance       Past Medical History:  Problem List:  2021-10: Facial lesion  2021-07: Graves disease  2021-03: Hyperthyroidism  2021-03: Mixed hyperlipidemia  2021-03: WBC decreased  2021-02: Menopausal state  2021-02: Mild intermittent asthma without complication  2020-06: Tenderness  2020-01: Headache  2018-06: Bilateral pes planus  2018-06: Gastrocnemius equinus of left lower extremity  2018-06: Gastrocnemius equinus, right  2017-03: Foot pain, right      Past Surgical History:  Past  "Surgical History:   Procedure Laterality Date   • OVARIAN CYSTECTOMY  1993        Allergies:  Aspirin     Social History:  Social History     Tobacco Use   • Smoking status: Never Smoker   • Smokeless tobacco: Never Used   Vaping Use   • Vaping Use: Never used   Substance Use Topics   • Alcohol use: Yes     Comment: 1-2 drinks per month   • Drug use: Never        Family History:   family history includes Cancer in her father and mother; Diabetes in her maternal great-grandmother; Heart Disease in her mother.      PHYSICAL EXAM:   Vital signs: /72 (BP Location: Left arm, Patient Position: Sitting, BP Cuff Size: Adult)   Pulse 81   Ht 1.676 m (5' 6\")   Wt 95.3 kg (210 lb)   SpO2 96%   BMI 33.89 kg/m²   GENERAL: Well-developed, well-nourished in no apparent distress.   EYE:  No ocular asymmetry, PERRLA, No exophthalmos or lid lag  HENT: Pink, moist mucous membranes.    NECK: No thyromegaly.   CARDIOVASCULAR:  No murmurs  LUNGS: Clear breath sounds  ABDOMEN: Soft, nontender   EXTREMITIES: No clubbing, cyanosis, or edema.   NEUROLOGICAL: No gross focal motor abnormalities, No visible tremors with both hands  LYMPH: No cervical adenopathy palpated.   SKIN: No rashes, lesions.     Labs:  Lab Results   Component Value Date/Time    WBC 5.1 01/12/2022 07:40 AM    RBC 4.57 01/12/2022 07:40 AM    HEMOGLOBIN 13.7 01/12/2022 07:40 AM    MCV 88.8 01/12/2022 07:40 AM    MCH 30.0 01/12/2022 07:40 AM    MCHC 33.7 01/12/2022 07:40 AM    RDW 42.1 01/12/2022 07:40 AM    MPV 10.5 01/12/2022 07:40 AM       Lab Results   Component Value Date/Time    SODIUM 140 01/12/2022 07:40 AM    POTASSIUM 4.5 01/12/2022 07:40 AM    CHLORIDE 104 01/12/2022 07:40 AM    CO2 25 01/12/2022 07:40 AM    ANION 11.0 01/12/2022 07:40 AM    GLUCOSE 94 01/12/2022 07:40 AM    BUN 9 01/12/2022 07:40 AM    CREATININE 0.63 01/12/2022 07:40 AM    CALCIUM 9.6 01/12/2022 07:40 AM    ASTSGOT 15 01/12/2022 07:40 AM    ALTSGPT 15 01/12/2022 07:40 AM    TBILIRUBIN " 0.4 01/12/2022 07:40 AM    ALBUMIN 4.5 01/12/2022 07:40 AM    TOTPROTEIN 7.1 01/12/2022 07:40 AM    GLOBULIN 2.6 01/12/2022 07:40 AM    AGRATIO 1.7 01/12/2022 07:40 AM       Lab Results   Component Value Date/Time    TSHULTRASEN 2.780 07/15/2022 1056     Lab Results   Component Value Date/Time    FREET4 0.97 07/15/2022 1056     Lab Results   Component Value Date/Time    FREET3 2.48 07/15/2022 1056     No results found for: THYSTIMIG      Imaging:      ASSESSMENT/PLAN:     1. Hyperthyroidism  Stable  I want her to go back to methimazole half a pill or 2.5 mg every other day as her TSH was more optimal on this particular dose  Explained how methimazole works  Reviewed labs with patient  Follow-up in 6 months a repeat of TSH free T4 and free T3 levels  She should notify me if she has symptoms of uncontrolled hyperthyroidism or hypothyroidism    2. High risk medication use  Patient is taking methimazole which is high risk medication  Rarely methimazole can cause leukopenia and liver enzyme abnormalities  So far she is doing well  And we monitor her CBC and CMP periodically    3. Graves disease  This is the etiology of her hyperthyroidism is proven by her elevated thyroid autoantibodies for Graves' disease  We will schedule her for an ultrasound in the office next week    4. Vitamin D deficiency  Stable we will check calcium vitamin D reviewed her labs      Return in about 6 months (around 1/20/2023).      Thank you kindly for allowing me to participate in the thyroid care plan for this patient.    Ton Frausto MD, FACE, [facility]  07/20/22    CC:   Lakesha Antonio M.D.

## 2022-07-25 ENCOUNTER — PROCEDURE VISIT (OUTPATIENT)
Dept: ENDOCRINOLOGY | Facility: MEDICAL CENTER | Age: 57
End: 2022-07-25
Attending: INTERNAL MEDICINE
Payer: COMMERCIAL

## 2022-07-25 DIAGNOSIS — E05.90 HYPERTHYROIDISM: ICD-10-CM

## 2022-07-25 DIAGNOSIS — E05.00 GRAVES DISEASE: ICD-10-CM

## 2022-07-25 PROCEDURE — 76536 US EXAM OF HEAD AND NECK: CPT | Performed by: INTERNAL MEDICINE

## 2022-09-07 NOTE — PROGRESS NOTES
Subjective:     CC: Pap smear    HPI:   Sheron Le is a 57 y.o. female who presents for annual exam. She is feeling well and denies any complaints.    Ob-Gyn/ History:    Patient has GYN provider: No  /Para: 2/1  Last Pap Smear: About 3 years ago  History of abnormal pap smears: No  Gyn Surgery: Ovarian cystectomy   Currently sexually active: None  Current Contraceptive Method: N/A  Last menstrual period:  Age 48    Health Maintenance  Diet:  Trying to be healthy, tries to avoid gluten because of Graves disease  Exercise: Pilates, walking  Substance Use: Rare  Sunscreen used.    Cancer screening  Colorectal Cancer Screening: Colog[place] completed 3/2021  Cervical Cancer Screening: Completed today  Breast Cancer Screenin2021    Infectious disease screening/Immunizations  --Immunizations:    Tetanus: Given today   Shingles: Completed    She  has no past medical history on file.  She  has a past surgical history that includes ovarian cystectomy ().    Family History   Problem Relation Age of Onset    Cancer Mother         uterine    Heart Disease Mother     Cancer Father         lymphoma    Diabetes Maternal great-grandmother        Social History     Socioeconomic History    Marital status:      Spouse name: Not on file    Number of children: Not on file    Years of education: Not on file    Highest education level: Not on file   Occupational History    Not on file   Tobacco Use    Smoking status: Never    Smokeless tobacco: Never   Vaping Use    Vaping Use: Never used   Substance and Sexual Activity    Alcohol use: Yes     Comment: 1-2 drinks per month    Drug use: Never    Sexual activity: Yes     Partners: Male     Birth control/protection: Post-Menopausal, None   Other Topics Concern    Not on file   Social History Narrative    Not on file     Social Determinants of Health     Financial Resource Strain: Not on file   Food Insecurity: Not on file   Transportation Needs: Not on  file   Physical Activity: Not on file   Stress: Not on file   Social Connections: Not on file   Intimate Partner Violence: Not on file   Housing Stability: Not on file       Patient Active Problem List    Diagnosis Date Noted    Facial lesion 10/11/2021    Graves disease 07/19/2021    Hyperthyroidism 03/05/2021    Mixed hyperlipidemia 03/05/2021    WBC decreased 03/05/2021    Menopausal state 02/23/2021    Mild intermittent asthma without complication 02/23/2021    Tenderness 06/26/2020    Headache 01/31/2020    Bilateral pes planus 06/08/2018    Gastrocnemius equinus of left lower extremity 06/08/2018    Gastrocnemius equinus, right 06/08/2018    Foot pain, right 03/21/2017         Current Outpatient Medications   Medication Sig Dispense Refill    methimazole (TAPAZOLE) 5 MG Tab Take 1 Tablet by mouth every day. 90 Tablet 1    albuterol 108 (90 Base) MCG/ACT Aero Soln inhalation aerosol Inhale 1 Puff every 6 hours as needed for Shortness of Breath. 6.7 g 2    Cholecalciferol (VITAMIN D3) 125 MCG (5000 UT) Tab Take  by mouth.      rizatriptan (MAXALT) 5 MG tablet Take 1 Tablet by mouth one time as needed for Migraine for up to 1 dose. May repeat dose once, one hour after initial dose. 10 Tablet 3    Multiple Vitamins-Minerals (ZINC PO) Take 2 Tablets by mouth every day.      Ascorbic Acid (VITAMIN C PO) Take 1 Tablet by mouth every day.       No current facility-administered medications for this visit.     Allergies   Allergen Reactions    Aspirin Swelling     Face swelling       Review of Systems:  Constitutional: Negative for fever, chills   Respiratory: Negative for cough and shortness of breath.    Cardiovascular: Negative for chest pain or palpitations  Gastrointestinal: Negative for nausea, vomiting, abdominal pain and diarrhea.   Psychiatric/Behavioral: Negative for depression.  The patient is not nervous/anxious.    Objective:     /72 (BP Location: Right arm, Patient Position: Sitting, BP Cuff Size:  "Adult)   Pulse 79   Temp 36.7 °C (98 °F) (Temporal)   Resp 18   Ht 1.676 m (5' 6\")   Wt 94.3 kg (207 lb 12.8 oz)   SpO2 100%   BMI 33.54 kg/m²   Body mass index is 33.54 kg/m².  Wt Readings from Last 4 Encounters:   09/08/22 94.3 kg (207 lb 12.8 oz)   07/20/22 95.3 kg (210 lb)   03/11/22 93.5 kg (206 lb 3.2 oz)   01/12/22 84.8 kg (187 lb)       Physical Exam:  Constitutional: Well-developed and well-nourished. Not diaphoretic. No distress.   :Perineum and external genitalia normal without rash. Vagina with normal and physiologic discharge. Cervix without visible lesions or discharge. Bimanual exam without adnexal masses or cervical motion     A chaperone was offered to the patient during today's exam. Chaperone name: Serina Peterson was present.    Assessment and Plan:     Sheron Le is a 57 y.o. female who presents for annual exam. She is feeling well and denies any complaints. There are no concerning signs and/or symptoms of any significant disease process.  Normal exam findings.  Discussed regular exercise, healthy diet, abstinence from smoking, drugs, and excessive alcohol.     Wellness examination  Pap smear for cervical cancer screening  - THINPREP PAP WITH HPV; Future    Graves disease  - Referral to Endocrinology    Encounter for screening mammogram for malignant neoplasm of breast  - MA-SCREENING MAMMO BILAT W/TOMOSYNTHESIS W/CAD; Future    Colon cancer screening  - Referral to GI for Colonoscopy    Need for vaccination  - Tdap =>6yo IM      HCM: Completed  Labs per orders  Immunizations per orders  Patient counseled about diet, exercise, and sun protection.      Follow-up: Return in about 6 months (around 3/8/2023) for f/u labs.    Please note that this dictation was created using voice recognition software. I have made every reasonable attempt to correct obvious errors, but I expect that there are errors of grammar and possibly content that I did not discover before finalizing the " note.

## 2022-09-08 ENCOUNTER — HOSPITAL ENCOUNTER (OUTPATIENT)
Facility: MEDICAL CENTER | Age: 57
End: 2022-09-08
Attending: INTERNAL MEDICINE
Payer: COMMERCIAL

## 2022-09-08 ENCOUNTER — OFFICE VISIT (OUTPATIENT)
Dept: MEDICAL GROUP | Facility: PHYSICIAN GROUP | Age: 57
End: 2022-09-08
Payer: COMMERCIAL

## 2022-09-08 VITALS
SYSTOLIC BLOOD PRESSURE: 120 MMHG | TEMPERATURE: 98 F | HEART RATE: 79 BPM | DIASTOLIC BLOOD PRESSURE: 72 MMHG | WEIGHT: 207.8 LBS | HEIGHT: 66 IN | RESPIRATION RATE: 18 BRPM | OXYGEN SATURATION: 100 % | BODY MASS INDEX: 33.4 KG/M2

## 2022-09-08 DIAGNOSIS — Z00.00 WELLNESS EXAMINATION: ICD-10-CM

## 2022-09-08 DIAGNOSIS — Z12.11 COLON CANCER SCREENING: ICD-10-CM

## 2022-09-08 DIAGNOSIS — Z23 NEED FOR VACCINATION: ICD-10-CM

## 2022-09-08 DIAGNOSIS — Z12.31 ENCOUNTER FOR SCREENING MAMMOGRAM FOR MALIGNANT NEOPLASM OF BREAST: ICD-10-CM

## 2022-09-08 DIAGNOSIS — Z12.4 PAP SMEAR FOR CERVICAL CANCER SCREENING: ICD-10-CM

## 2022-09-08 DIAGNOSIS — E05.00 GRAVES DISEASE: ICD-10-CM

## 2022-09-08 PROCEDURE — 99396 PREV VISIT EST AGE 40-64: CPT | Mod: 25 | Performed by: INTERNAL MEDICINE

## 2022-09-08 PROCEDURE — 87624 HPV HI-RISK TYP POOLED RSLT: CPT

## 2022-09-08 PROCEDURE — 90715 TDAP VACCINE 7 YRS/> IM: CPT | Performed by: INTERNAL MEDICINE

## 2022-09-08 PROCEDURE — 88175 CYTOPATH C/V AUTO FLUID REDO: CPT

## 2022-09-08 PROCEDURE — 90471 IMMUNIZATION ADMIN: CPT | Performed by: INTERNAL MEDICINE

## 2022-09-08 ASSESSMENT — FIBROSIS 4 INDEX: FIB4 SCORE: 0.86

## 2022-09-09 LAB
CYTOLOGY REG CYTOL: NORMAL
HPV HR 12 DNA CVX QL NAA+PROBE: NEGATIVE
HPV16 DNA SPEC QL NAA+PROBE: NEGATIVE
HPV18 DNA SPEC QL NAA+PROBE: NEGATIVE
SPECIMEN SOURCE: NORMAL

## 2022-11-11 ENCOUNTER — HOSPITAL ENCOUNTER (OUTPATIENT)
Dept: RADIOLOGY | Facility: MEDICAL CENTER | Age: 57
End: 2022-11-11
Attending: INTERNAL MEDICINE
Payer: COMMERCIAL

## 2022-11-11 DIAGNOSIS — Z12.31 ENCOUNTER FOR SCREENING MAMMOGRAM FOR MALIGNANT NEOPLASM OF BREAST: ICD-10-CM

## 2022-11-11 PROCEDURE — 77063 BREAST TOMOSYNTHESIS BI: CPT

## 2023-01-19 ENCOUNTER — HOSPITAL ENCOUNTER (OUTPATIENT)
Dept: LAB | Facility: MEDICAL CENTER | Age: 58
End: 2023-01-19
Attending: INTERNAL MEDICINE
Payer: COMMERCIAL

## 2023-01-19 DIAGNOSIS — E55.9 VITAMIN D DEFICIENCY: ICD-10-CM

## 2023-01-19 DIAGNOSIS — E05.90 HYPERTHYROIDISM: ICD-10-CM

## 2023-01-19 DIAGNOSIS — E05.00 GRAVES DISEASE: ICD-10-CM

## 2023-01-19 DIAGNOSIS — Z79.899 HIGH RISK MEDICATION USE: ICD-10-CM

## 2023-01-19 LAB
25(OH)D3 SERPL-MCNC: 44 NG/ML (ref 30–100)
ALBUMIN SERPL BCP-MCNC: 4.6 G/DL (ref 3.2–4.9)
ALBUMIN/GLOB SERPL: 1.9 G/DL
ALP SERPL-CCNC: 90 U/L (ref 30–99)
ALT SERPL-CCNC: 15 U/L (ref 2–50)
ANION GAP SERPL CALC-SCNC: 12 MMOL/L (ref 7–16)
AST SERPL-CCNC: 13 U/L (ref 12–45)
BASOPHILS # BLD AUTO: 0.8 % (ref 0–1.8)
BASOPHILS # BLD: 0.04 K/UL (ref 0–0.12)
BILIRUB SERPL-MCNC: 0.4 MG/DL (ref 0.1–1.5)
BUN SERPL-MCNC: 10 MG/DL (ref 8–22)
CALCIUM ALBUM COR SERPL-MCNC: 9.3 MG/DL (ref 8.5–10.5)
CALCIUM SERPL-MCNC: 9.8 MG/DL (ref 8.5–10.5)
CHLORIDE SERPL-SCNC: 102 MMOL/L (ref 96–112)
CO2 SERPL-SCNC: 24 MMOL/L (ref 20–33)
CREAT SERPL-MCNC: 0.67 MG/DL (ref 0.5–1.4)
EOSINOPHIL # BLD AUTO: 0.11 K/UL (ref 0–0.51)
EOSINOPHIL NFR BLD: 2.3 % (ref 0–6.9)
ERYTHROCYTE [DISTWIDTH] IN BLOOD BY AUTOMATED COUNT: 41.3 FL (ref 35.9–50)
FASTING STATUS PATIENT QL REPORTED: NORMAL
GFR SERPLBLD CREATININE-BSD FMLA CKD-EPI: 102 ML/MIN/1.73 M 2
GLOBULIN SER CALC-MCNC: 2.4 G/DL (ref 1.9–3.5)
GLUCOSE SERPL-MCNC: 88 MG/DL (ref 65–99)
HCT VFR BLD AUTO: 40.7 % (ref 37–47)
HGB BLD-MCNC: 13.6 G/DL (ref 12–16)
IMM GRANULOCYTES # BLD AUTO: 0.02 K/UL (ref 0–0.11)
IMM GRANULOCYTES NFR BLD AUTO: 0.4 % (ref 0–0.9)
LYMPHOCYTES # BLD AUTO: 1.56 K/UL (ref 1–4.8)
LYMPHOCYTES NFR BLD: 33.1 % (ref 22–41)
MCH RBC QN AUTO: 30.3 PG (ref 27–33)
MCHC RBC AUTO-ENTMCNC: 33.4 G/DL (ref 33.6–35)
MCV RBC AUTO: 90.6 FL (ref 81.4–97.8)
MONOCYTES # BLD AUTO: 0.38 K/UL (ref 0–0.85)
MONOCYTES NFR BLD AUTO: 8.1 % (ref 0–13.4)
NEUTROPHILS # BLD AUTO: 2.61 K/UL (ref 2–7.15)
NEUTROPHILS NFR BLD: 55.3 % (ref 44–72)
NRBC # BLD AUTO: 0 K/UL
NRBC BLD-RTO: 0 /100 WBC
PLATELET # BLD AUTO: 246 K/UL (ref 164–446)
PMV BLD AUTO: 10.8 FL (ref 9–12.9)
POTASSIUM SERPL-SCNC: 4.2 MMOL/L (ref 3.6–5.5)
PROT SERPL-MCNC: 7 G/DL (ref 6–8.2)
RBC # BLD AUTO: 4.49 M/UL (ref 4.2–5.4)
SODIUM SERPL-SCNC: 138 MMOL/L (ref 135–145)
T3FREE SERPL-MCNC: 2.71 PG/ML (ref 2–4.4)
T4 FREE SERPL-MCNC: 1.15 NG/DL (ref 0.93–1.7)
TSH SERPL DL<=0.005 MIU/L-ACNC: 2.48 UIU/ML (ref 0.38–5.33)
WBC # BLD AUTO: 4.7 K/UL (ref 4.8–10.8)

## 2023-01-19 PROCEDURE — 82306 VITAMIN D 25 HYDROXY: CPT

## 2023-01-19 PROCEDURE — 84439 ASSAY OF FREE THYROXINE: CPT

## 2023-01-19 PROCEDURE — 84481 FREE ASSAY (FT-3): CPT

## 2023-01-19 PROCEDURE — 84443 ASSAY THYROID STIM HORMONE: CPT

## 2023-01-19 PROCEDURE — 36415 COLL VENOUS BLD VENIPUNCTURE: CPT

## 2023-01-19 PROCEDURE — 80053 COMPREHEN METABOLIC PANEL: CPT

## 2023-01-19 PROCEDURE — 85025 COMPLETE CBC W/AUTO DIFF WBC: CPT

## 2023-01-24 ENCOUNTER — OFFICE VISIT (OUTPATIENT)
Dept: ENDOCRINOLOGY | Facility: MEDICAL CENTER | Age: 58
End: 2023-01-24
Attending: INTERNAL MEDICINE
Payer: COMMERCIAL

## 2023-01-24 VITALS
DIASTOLIC BLOOD PRESSURE: 64 MMHG | OXYGEN SATURATION: 98 % | HEART RATE: 82 BPM | WEIGHT: 211.2 LBS | RESPIRATION RATE: 17 BRPM | SYSTOLIC BLOOD PRESSURE: 132 MMHG | HEIGHT: 66 IN | BODY MASS INDEX: 33.94 KG/M2

## 2023-01-24 DIAGNOSIS — Z79.899 HIGH RISK MEDICATION USE: ICD-10-CM

## 2023-01-24 DIAGNOSIS — E05.00 GRAVES DISEASE: ICD-10-CM

## 2023-01-24 DIAGNOSIS — E05.90 HYPERTHYROIDISM: ICD-10-CM

## 2023-01-24 DIAGNOSIS — E55.9 VITAMIN D DEFICIENCY: ICD-10-CM

## 2023-01-24 PROCEDURE — 99211 OFF/OP EST MAY X REQ PHY/QHP: CPT | Performed by: INTERNAL MEDICINE

## 2023-01-24 PROCEDURE — 99214 OFFICE O/P EST MOD 30 MIN: CPT | Performed by: INTERNAL MEDICINE

## 2023-01-24 RX ORDER — METHIMAZOLE 5 MG/1
5 TABLET ORAL DAILY
Qty: 90 TABLET | Refills: 1 | Status: SHIPPED | OUTPATIENT
Start: 2023-01-24 | End: 2023-10-31

## 2023-01-24 ASSESSMENT — PATIENT HEALTH QUESTIONNAIRE - PHQ9: CLINICAL INTERPRETATION OF PHQ2 SCORE: 0

## 2023-01-24 ASSESSMENT — FIBROSIS 4 INDEX: FIB4 SCORE: 0.76

## 2023-01-24 NOTE — PROGRESS NOTES
Chief Complaint: Follow up for Hyperthyroidism secondary to Grave's disease    HPI:     Sheron Le is a 57 y.o. female here for follow up of Hyperthyroidism.      She was previously seen by the nurse practitioner and is my first time meeting her    She was diagnosed with Graves' hyperthyroidism in July 2021.  Her baseline TSI and thyrotropin receptor antibodies were positive  Her formal thyroid ultrasound in the office in July 2022 showed no focal nodules      She is now on methimazole 2.5 mg every other day  She reports good compliance  She denies unexplained weight loss, tremors, palpitations and insomnia   She denies symptoms of thyroid eye disease      Her TSH is normal at 2.4 with normal free T4 and free T3 levels on January 19, 2023    Her vitamin D is stable at 44 with normal calcium levels          Patient's medications, allergies, and social histories were reviewed and updated as appropriate.      ROS:     CONS:     No fever, no chills   EYES:     No diplopia, no blurry vision   CV:           No chest pain, no palpitations   PULM:     No SOB, no cough, no hemoptysis.   GI:            No nausea, no vomiting, no diarrhea, no constipation   ENDO:     No polyuria, no polydipsia, no heat intolerance, no cold intolerance       Past Medical History:  Problem List:  2021-10: Facial lesion  2021-07: Graves disease  2021-03: Hyperthyroidism  2021-03: Mixed hyperlipidemia  2021-03: WBC decreased  2021-02: Menopausal state  2021-02: Mild intermittent asthma without complication  2020-06: Tenderness  2020-01: Headache  2018-06: Bilateral pes planus  2018-06: Gastrocnemius equinus of left lower extremity  2018-06: Gastrocnemius equinus, right  2017-03: Foot pain, right      Past Surgical History:  Past Surgical History:   Procedure Laterality Date    OVARIAN CYSTECTOMY  1993        Allergies:  Aspirin     Social History:  Social History     Tobacco Use    Smoking status: Never    Smokeless tobacco: Never   Vaping  "Use    Vaping Use: Never used   Substance Use Topics    Alcohol use: Yes     Comment: 1-2 drinks per month    Drug use: Never        Family History:   family history includes Cancer in her father and mother; Diabetes in her maternal great-grandmother; Heart Disease in her mother.      PHYSICAL EXAM:   Vital signs: /64 (BP Location: Left arm, Patient Position: Sitting)   Pulse 82   Resp 17   Ht 1.676 m (5' 6\")   Wt 95.8 kg (211 lb 3.2 oz)   SpO2 98%   BMI 34.09 kg/m²   GENERAL: Well-developed, well-nourished in no apparent distress.   EYE:  No ocular asymmetry, PERRLA, No exophthalmos or lid lag  HENT: Pink, moist mucous membranes.    NECK: No thyromegaly.   CARDIOVASCULAR:  No murmurs  LUNGS: Clear breath sounds  ABDOMEN: Soft, nontender   EXTREMITIES: No clubbing, cyanosis, or edema.   NEUROLOGICAL: No gross focal motor abnormalities, No visible tremors with both hands  LYMPH: No cervical adenopathy palpated.   SKIN: No rashes, lesions.     Labs:  Lab Results   Component Value Date/Time    WBC 4.7 (L) 01/19/2023 09:52 AM    RBC 4.49 01/19/2023 09:52 AM    HEMOGLOBIN 13.6 01/19/2023 09:52 AM    MCV 90.6 01/19/2023 09:52 AM    MCH 30.3 01/19/2023 09:52 AM    MCHC 33.4 (L) 01/19/2023 09:52 AM    RDW 41.3 01/19/2023 09:52 AM    MPV 10.8 01/19/2023 09:52 AM       Lab Results   Component Value Date/Time    SODIUM 138 01/19/2023 09:52 AM    POTASSIUM 4.2 01/19/2023 09:52 AM    CHLORIDE 102 01/19/2023 09:52 AM    CO2 24 01/19/2023 09:52 AM    ANION 12.0 01/19/2023 09:52 AM    GLUCOSE 88 01/19/2023 09:52 AM    BUN 10 01/19/2023 09:52 AM    CREATININE 0.67 01/19/2023 09:52 AM    CALCIUM 9.8 01/19/2023 09:52 AM    ASTSGOT 13 01/19/2023 09:52 AM    ALTSGPT 15 01/19/2023 09:52 AM    TBILIRUBIN 0.4 01/19/2023 09:52 AM    ALBUMIN 4.6 01/19/2023 09:52 AM    TOTPROTEIN 7.0 01/19/2023 09:52 AM    GLOBULIN 2.4 01/19/2023 09:52 AM    AGRATIO 1.9 01/19/2023 09:52 AM       Lab Results   Component Value Date/Time    " TSHULTRASEN 2.780 07/15/2022 1056     Lab Results   Component Value Date/Time    FREET4 0.97 07/15/2022 1056     Lab Results   Component Value Date/Time    FREET3 2.48 07/15/2022 1056     No results found for: THYSTIMIG      Imaging:      ASSESSMENT/PLAN:     1. Hyperthyroidism  Stable  Continue methimazole 2.5 mg every other day  I am going to repeat her thyroid labs in 6 months and evaluate for remission of Graves' disease and check the antibody levels  Follow-up in 6 months with labs    2. High risk medication use  Patient is taking methimazole which is high risk medication  Rarely methimazole can cause leukopenia and liver enzyme abnormalities  We will continue to monitor her CBC and CMP periodically    3. Graves disease  This is the etiology of her hyperthyroidism is proven by her elevated thyroid autoantibodies for Graves' disease    4. Vitamin D deficiency  Stable   Vitamin D labs were reviewed with patient  Continue current supplements  Continue monitoring levels       Return in about 6 months (around 7/24/2023).      Thank you kindly for allowing me to participate in the thyroid care plan for this patient.    Ton Frausto MD, [facility], [facility]      CC:   Lakesha Antonio M.D.

## 2023-03-10 DIAGNOSIS — E78.2 MIXED HYPERLIPIDEMIA: ICD-10-CM

## 2023-03-14 DIAGNOSIS — E66.9 OBESITY (BMI 30-39.9): ICD-10-CM

## 2023-03-14 RX ORDER — PHENTERMINE HYDROCHLORIDE 37.5 MG/1
37.5 CAPSULE ORAL EVERY MORNING
Qty: 30 CAPSULE | Refills: 5 | Status: SHIPPED | OUTPATIENT
Start: 2023-03-14 | End: 2023-04-13

## 2023-07-26 ENCOUNTER — HOSPITAL ENCOUNTER (OUTPATIENT)
Dept: LAB | Facility: MEDICAL CENTER | Age: 58
End: 2023-07-26
Attending: INTERNAL MEDICINE
Payer: COMMERCIAL

## 2023-07-26 DIAGNOSIS — Z79.899 HIGH RISK MEDICATION USE: ICD-10-CM

## 2023-07-26 DIAGNOSIS — E05.00 GRAVES DISEASE: ICD-10-CM

## 2023-07-26 DIAGNOSIS — E05.90 HYPERTHYROIDISM: ICD-10-CM

## 2023-07-26 DIAGNOSIS — E55.9 VITAMIN D DEFICIENCY: ICD-10-CM

## 2023-07-26 LAB
ALBUMIN SERPL BCP-MCNC: 4.5 G/DL (ref 3.2–4.9)
ALBUMIN/GLOB SERPL: 1.7 G/DL
ALP SERPL-CCNC: 91 U/L (ref 30–99)
ALT SERPL-CCNC: 20 U/L (ref 2–50)
ANION GAP SERPL CALC-SCNC: 12 MMOL/L (ref 7–16)
AST SERPL-CCNC: 18 U/L (ref 12–45)
BASOPHILS # BLD AUTO: 0.6 % (ref 0–1.8)
BASOPHILS # BLD: 0.03 K/UL (ref 0–0.12)
BILIRUB SERPL-MCNC: 0.3 MG/DL (ref 0.1–1.5)
BUN SERPL-MCNC: 11 MG/DL (ref 8–22)
CALCIUM ALBUM COR SERPL-MCNC: 9.5 MG/DL (ref 8.5–10.5)
CALCIUM SERPL-MCNC: 9.9 MG/DL (ref 8.5–10.5)
CHLORIDE SERPL-SCNC: 104 MMOL/L (ref 96–112)
CO2 SERPL-SCNC: 22 MMOL/L (ref 20–33)
CREAT SERPL-MCNC: 0.67 MG/DL (ref 0.5–1.4)
EOSINOPHIL # BLD AUTO: 0.09 K/UL (ref 0–0.51)
EOSINOPHIL NFR BLD: 1.7 % (ref 0–6.9)
ERYTHROCYTE [DISTWIDTH] IN BLOOD BY AUTOMATED COUNT: 41.4 FL (ref 35.9–50)
GFR SERPLBLD CREATININE-BSD FMLA CKD-EPI: 102 ML/MIN/1.73 M 2
GLOBULIN SER CALC-MCNC: 2.7 G/DL (ref 1.9–3.5)
GLUCOSE SERPL-MCNC: 89 MG/DL (ref 65–99)
HCT VFR BLD AUTO: 42.5 % (ref 37–47)
HGB BLD-MCNC: 13.9 G/DL (ref 12–16)
IMM GRANULOCYTES # BLD AUTO: 0.01 K/UL (ref 0–0.11)
IMM GRANULOCYTES NFR BLD AUTO: 0.2 % (ref 0–0.9)
LYMPHOCYTES # BLD AUTO: 1.43 K/UL (ref 1–4.8)
LYMPHOCYTES NFR BLD: 26.4 % (ref 22–41)
MCH RBC QN AUTO: 29.5 PG (ref 27–33)
MCHC RBC AUTO-ENTMCNC: 32.7 G/DL (ref 32.2–35.5)
MCV RBC AUTO: 90.2 FL (ref 81.4–97.8)
MONOCYTES # BLD AUTO: 0.38 K/UL (ref 0–0.85)
MONOCYTES NFR BLD AUTO: 7 % (ref 0–13.4)
NEUTROPHILS # BLD AUTO: 3.48 K/UL (ref 1.82–7.42)
NEUTROPHILS NFR BLD: 64.1 % (ref 44–72)
NRBC # BLD AUTO: 0 K/UL
NRBC BLD-RTO: 0 /100 WBC (ref 0–0.2)
PLATELET # BLD AUTO: 256 K/UL (ref 164–446)
PMV BLD AUTO: 10.8 FL (ref 9–12.9)
POTASSIUM SERPL-SCNC: 3.9 MMOL/L (ref 3.6–5.5)
PROT SERPL-MCNC: 7.2 G/DL (ref 6–8.2)
RBC # BLD AUTO: 4.71 M/UL (ref 4.2–5.4)
SODIUM SERPL-SCNC: 138 MMOL/L (ref 135–145)
T3FREE SERPL-MCNC: 2.96 PG/ML (ref 2–4.4)
T4 FREE SERPL-MCNC: 1.09 NG/DL (ref 0.93–1.7)
TSH SERPL DL<=0.005 MIU/L-ACNC: 1.79 UIU/ML (ref 0.38–5.33)
WBC # BLD AUTO: 5.4 K/UL (ref 4.8–10.8)

## 2023-07-26 PROCEDURE — 84443 ASSAY THYROID STIM HORMONE: CPT

## 2023-07-26 PROCEDURE — 80053 COMPREHEN METABOLIC PANEL: CPT

## 2023-07-26 PROCEDURE — 84481 FREE ASSAY (FT-3): CPT

## 2023-07-26 PROCEDURE — 85025 COMPLETE CBC W/AUTO DIFF WBC: CPT

## 2023-07-26 PROCEDURE — 84439 ASSAY OF FREE THYROXINE: CPT

## 2023-07-26 PROCEDURE — 36415 COLL VENOUS BLD VENIPUNCTURE: CPT

## 2023-07-26 PROCEDURE — 84445 ASSAY OF TSI GLOBULIN: CPT

## 2023-07-29 LAB — TSI SER-ACNC: <0.1 IU/L

## 2023-08-09 ENCOUNTER — OFFICE VISIT (OUTPATIENT)
Dept: ENDOCRINOLOGY | Facility: MEDICAL CENTER | Age: 58
End: 2023-08-09
Attending: INTERNAL MEDICINE
Payer: COMMERCIAL

## 2023-08-09 VITALS
RESPIRATION RATE: 20 BRPM | SYSTOLIC BLOOD PRESSURE: 110 MMHG | DIASTOLIC BLOOD PRESSURE: 68 MMHG | HEIGHT: 66 IN | WEIGHT: 215.7 LBS | HEART RATE: 80 BPM | OXYGEN SATURATION: 97 % | BODY MASS INDEX: 34.67 KG/M2

## 2023-08-09 DIAGNOSIS — E05.00 GRAVES DISEASE: ICD-10-CM

## 2023-08-09 DIAGNOSIS — E05.90 HYPERTHYROIDISM: ICD-10-CM

## 2023-08-09 DIAGNOSIS — E55.9 VITAMIN D DEFICIENCY: ICD-10-CM

## 2023-08-09 DIAGNOSIS — E66.9 OBESITY (BMI 30-39.9): ICD-10-CM

## 2023-08-09 DIAGNOSIS — Z79.899 HIGH RISK MEDICATION USE: ICD-10-CM

## 2023-08-09 PROCEDURE — 3074F SYST BP LT 130 MM HG: CPT | Performed by: INTERNAL MEDICINE

## 2023-08-09 PROCEDURE — 99211 OFF/OP EST MAY X REQ PHY/QHP: CPT | Performed by: INTERNAL MEDICINE

## 2023-08-09 PROCEDURE — 3078F DIAST BP <80 MM HG: CPT | Performed by: INTERNAL MEDICINE

## 2023-08-09 PROCEDURE — 99215 OFFICE O/P EST HI 40 MIN: CPT | Performed by: INTERNAL MEDICINE

## 2023-08-09 RX ORDER — SEMAGLUTIDE 0.25 MG/.5ML
0.25 INJECTION, SOLUTION SUBCUTANEOUS
Qty: 2 ML | Refills: 3 | Status: SHIPPED | OUTPATIENT
Start: 2023-08-09 | End: 2023-08-11 | Stop reason: SDUPTHER

## 2023-08-09 ASSESSMENT — FIBROSIS 4 INDEX: FIB4 SCORE: 0.9

## 2023-08-09 NOTE — PROGRESS NOTES
Chief Complaint: Follow up for Hyperthyroidism secondary to Grave's disease    HPI:     Sheron Le is a 58 y.o. female here for follow up of Hyperthyroidism.      She was previously seen by the nurse practitioner and is my first time meeting her    She was diagnosed with Graves' hyperthyroidism in July 2021.    Her baseline TSI and thyrotropin receptor antibodies were positive  Her formal thyroid ultrasound in the office in July 2022 showed no focal nodules      She remains on methimazole 2.5 mg every other day  She reports good compliance  She denies unexplained weight loss, tremors, palpitations and insomnia   She denies symptoms of thyroid eye disease      Her labs are stable and the autoantibodies that were elevated are now normal and we discussed potential remission        Latest Reference Range & Units 07/26/23 10:02   TSH 0.380 - 5.330 uIU/mL 1.790   Free T-4 0.93 - 1.70 ng/dL 1.09   T3,Free 2.00 - 4.40 pg/mL 2.96   Thyroid Stim Immunoglobulin <=0.54 IU/L <0.10       Additionally she has obesity with a BMI of 34  She denies HTN, ANUJ   She is unsure if she has hyperlipidemia  She tried and failed diet and exercise  She tried phentermine due to dry mouth  She is unsure if she wants to try GLP-1 analogs for weight management        Patient's medications, allergies, and social histories were reviewed and updated as appropriate.      ROS:     CONS:     No fever, no chills   EYES:     No diplopia, no blurry vision   CV:           No chest pain, no palpitations   PULM:     No SOB, no cough, no hemoptysis.   GI:            No nausea, no vomiting, no diarrhea, no constipation   ENDO:     No polyuria, no polydipsia, no heat intolerance, no cold intolerance       Past Medical History:  Problem List:  2021-10: Facial lesion  2021-07: Graves disease  2021-03: Hyperthyroidism  2021-03: Mixed hyperlipidemia  2021-03: WBC decreased  2021-02: Menopausal state  2021-02: Mild intermittent asthma without  "complication  2020-06: Tenderness  2020-01: Headache  2018-06: Bilateral pes planus  2018-06: Gastrocnemius equinus of left lower extremity  2018-06: Gastrocnemius equinus, right  2017-03: Foot pain, right      Past Surgical History:  Past Surgical History:   Procedure Laterality Date    OVARIAN CYSTECTOMY  1993        Allergies:  Aspirin     Social History:  Social History     Tobacco Use    Smoking status: Never    Smokeless tobacco: Never   Vaping Use    Vaping Use: Never used   Substance Use Topics    Alcohol use: Yes     Comment: 1-2 drinks per month    Drug use: Never        Family History:   family history includes Cancer in her father and mother; Diabetes in her maternal great-grandmother; Heart Disease in her mother.      PHYSICAL EXAM:   Vital signs: /68 (BP Location: Left arm, Patient Position: Sitting, BP Cuff Size: Adult)   Pulse 80   Resp 20   Ht 1.676 m (5' 6\")   Wt 97.8 kg (215 lb 11.2 oz)   SpO2 97%   BMI 34.81 kg/m²   GENERAL: Well-developed, well-nourished in no apparent distress.   EYE:  No ocular asymmetry, PERRLA, No exophthalmos or lid lag  HENT: Pink, moist mucous membranes.    NECK: No thyromegaly.   CARDIOVASCULAR:  No murmurs  LUNGS: Clear breath sounds  ABDOMEN: Soft, nontender   EXTREMITIES: No clubbing, cyanosis, or edema.   NEUROLOGICAL: No gross focal motor abnormalities, No visible tremors with both hands  LYMPH: No cervical adenopathy palpated.   SKIN: No rashes, lesions.     Labs:  Lab Results   Component Value Date/Time    WBC 5.4 07/26/2023 10:02 AM    RBC 4.71 07/26/2023 10:02 AM    HEMOGLOBIN 13.9 07/26/2023 10:02 AM    MCV 90.2 07/26/2023 10:02 AM    MCH 29.5 07/26/2023 10:02 AM    MCHC 32.7 07/26/2023 10:02 AM    RDW 41.4 07/26/2023 10:02 AM    MPV 10.8 07/26/2023 10:02 AM       Lab Results   Component Value Date/Time    SODIUM 138 07/26/2023 10:02 AM    POTASSIUM 3.9 07/26/2023 10:02 AM    CHLORIDE 104 07/26/2023 10:02 AM    CO2 22 07/26/2023 10:02 AM    ANION " 12.0 07/26/2023 10:02 AM    GLUCOSE 89 07/26/2023 10:02 AM    BUN 11 07/26/2023 10:02 AM    CREATININE 0.67 07/26/2023 10:02 AM    CALCIUM 9.9 07/26/2023 10:02 AM    ASTSGOT 18 07/26/2023 10:02 AM    ALTSGPT 20 07/26/2023 10:02 AM    TBILIRUBIN 0.3 07/26/2023 10:02 AM    ALBUMIN 4.5 07/26/2023 10:02 AM    TOTPROTEIN 7.2 07/26/2023 10:02 AM    GLOBULIN 2.7 07/26/2023 10:02 AM    AGRATIO 1.7 07/26/2023 10:02 AM       Lab Results   Component Value Date/Time    TSHULTRASEN 2.780 07/15/2022 1056     Lab Results   Component Value Date/Time    FREET4 0.97 07/15/2022 1056     Lab Results   Component Value Date/Time    FREET3 2.48 07/15/2022 1056     No results found for: THYSTIMIG      Imaging:      ASSESSMENT/PLAN:     1. Hyperthyroidism  Stable  We discussed remission of her Graves'  Stop methimazole for now  Repeat labs in 1 month and we then have to discuss her labs to assess durability of remission  Reviewed symptoms if uncontrolled Graves' disease  Follow up in 6 months for reassessment       2. High risk medication use  Patient was taking methimazole which is high risk medication  Rarely methimazole can cause leukopenia and liver enzyme abnormalities  We will continue to monitor her CBC and CMP periodically while she is on the medication     3. Graves disease  This is the etiology of her hyperthyroidism is proven by her elevated thyroid autoantibodies for Graves' disease    4. Vitamin D deficiency  Stable   Vitamin D labs were reviewed with patient  Continue current supplements  Continue monitoring levels       5. Obesity (BMI 30-39.9)  Unstable  She is obese with BMI > 30  She tried and failed  6 months of a comprehensive weight loss program  She had a negative endocrine work up for obesity  She tried and failed phentermine  Will try Wegovy       Return in about 6 months (around 2/9/2024).      Total time spent on day of service was over 60 minutes which included obtaining a detailed history and physical exam,  ordering labs, coordinating care and scheduling future follow-up    Thank you kindly for allowing me to participate in the thyroid care plan for this patient.    Ton Frausto MD, FACE, [facility]      CC:   Lakesha Antonio M.D.

## 2023-08-11 ENCOUNTER — TELEPHONE (OUTPATIENT)
Dept: PHARMACY | Facility: MEDICAL CENTER | Age: 58
End: 2023-08-11

## 2023-08-11 DIAGNOSIS — E66.9 OBESITY (BMI 30-39.9): ICD-10-CM

## 2023-08-11 RX ORDER — SEMAGLUTIDE 0.25 MG/.5ML
0.25 INJECTION, SOLUTION SUBCUTANEOUS
Qty: 2 ML | Refills: 3 | Status: SHIPPED | OUTPATIENT
Start: 2023-08-11 | End: 2023-08-20

## 2023-08-11 NOTE — PROGRESS NOTES
Wegovy was sent 8/9/2023 - patient called and asked for it to be resent  again to [facility] lozano today

## 2023-08-11 NOTE — TELEPHONE ENCOUNTER
New order from In Basket for Wegovy 0.25MG/0.5ML auto-injectors #2 mls for 28 days    Ran Test Claim- PA required    Submitted PA in CMM-Key#[id number]     Diagnosis-E66.9 - Obesity, unspecified    Attached chart notes and answered clinical questions.    Waiting on Plan Response

## 2023-08-12 NOTE — TELEPHONE ENCOUNTER
Prior Authorization request has been denied due to patient's BMI not being equal or greater to 40%.     Weight Loss Medications are an Exclusion under your plan benefits and are not covered.      Denial Letter

## 2023-08-15 ENCOUNTER — TELEPHONE (OUTPATIENT)
Dept: PHARMACY | Facility: MEDICAL CENTER | Age: 58
End: 2023-08-15
Payer: COMMERCIAL

## 2023-08-15 NOTE — TELEPHONE ENCOUNTER
Spoke with patient asked if she wanted to fill the Wegovy on our 340B pricing she stated no its to much money she will wait for the Wegovy 0.25MG/0.5Ml become available again because that is cheaper, she said she will call me in a month or so to see if we have it in stock.    Patient called back said Day left her a message regarding her (WEGOVY) 0.25 MG/0.5ML to call me I run test claim and it requires a PA which Ayanna is working on gave her the 340B price which she is ok with paying $746.00 I also told her the medication was Back Ordered with no ETA, She want the pharmacy to call her when it comes in, I left the pharmacy a message.      Amalia Clifford, Pharmacy Liaison/ RX Coordinator

## 2023-08-20 DIAGNOSIS — E66.9 OBESITY (BMI 30-39.9): ICD-10-CM

## 2023-08-20 RX ORDER — SEMAGLUTIDE 1.7 MG/.75ML
1.7 INJECTION, SOLUTION SUBCUTANEOUS
Qty: 2 ML | Refills: 3 | Status: SHIPPED | OUTPATIENT
Start: 2023-08-20 | End: 2023-10-31

## 2023-09-08 DIAGNOSIS — E66.9 OBESITY (BMI 30-39.9): ICD-10-CM

## 2023-09-08 RX ORDER — SEMAGLUTIDE 0.25 MG/.5ML
0.25 INJECTION, SOLUTION SUBCUTANEOUS
Qty: 2 ML | Refills: 3 | OUTPATIENT
Start: 2023-09-08 | End: 2023-09-17

## 2023-09-08 RX ORDER — SEMAGLUTIDE 0.25 MG/.5ML
0.25 INJECTION, SOLUTION SUBCUTANEOUS
Qty: 2 ML | Refills: 3 | Status: CANCELLED | OUTPATIENT
Start: 2023-09-08 | End: 2023-09-17

## 2023-09-14 ENCOUNTER — HOSPITAL ENCOUNTER (OUTPATIENT)
Dept: LAB | Facility: MEDICAL CENTER | Age: 58
End: 2023-09-14
Attending: INTERNAL MEDICINE
Payer: COMMERCIAL

## 2023-09-14 DIAGNOSIS — E05.00 GRAVES DISEASE: ICD-10-CM

## 2023-09-14 DIAGNOSIS — E05.90 HYPERTHYROIDISM: ICD-10-CM

## 2023-09-14 DIAGNOSIS — E55.9 VITAMIN D DEFICIENCY: ICD-10-CM

## 2023-09-14 DIAGNOSIS — Z79.899 HIGH RISK MEDICATION USE: ICD-10-CM

## 2023-09-14 DIAGNOSIS — E78.2 MIXED HYPERLIPIDEMIA: ICD-10-CM

## 2023-09-14 DIAGNOSIS — E66.9 OBESITY (BMI 30-39.9): ICD-10-CM

## 2023-09-14 LAB
25(OH)D3 SERPL-MCNC: 48 NG/ML (ref 30–100)
ALBUMIN SERPL BCP-MCNC: 4.4 G/DL (ref 3.2–4.9)
ALBUMIN/GLOB SERPL: 1.6 G/DL
ALP SERPL-CCNC: 88 U/L (ref 30–99)
ALT SERPL-CCNC: 15 U/L (ref 2–50)
ANION GAP SERPL CALC-SCNC: 14 MMOL/L (ref 7–16)
AST SERPL-CCNC: 17 U/L (ref 12–45)
BILIRUB SERPL-MCNC: 0.4 MG/DL (ref 0.1–1.5)
BUN SERPL-MCNC: 9 MG/DL (ref 8–22)
CALCIUM ALBUM COR SERPL-MCNC: 9.8 MG/DL (ref 8.5–10.5)
CALCIUM SERPL-MCNC: 10.1 MG/DL (ref 8.5–10.5)
CHLORIDE SERPL-SCNC: 105 MMOL/L (ref 96–112)
CHOLEST SERPL-MCNC: 220 MG/DL (ref 100–199)
CO2 SERPL-SCNC: 22 MMOL/L (ref 20–33)
CREAT SERPL-MCNC: 0.76 MG/DL (ref 0.5–1.4)
FASTING STATUS PATIENT QL REPORTED: NORMAL
FASTING STATUS PATIENT QL REPORTED: NORMAL
GFR SERPLBLD CREATININE-BSD FMLA CKD-EPI: 91 ML/MIN/1.73 M 2
GLOBULIN SER CALC-MCNC: 2.7 G/DL (ref 1.9–3.5)
GLUCOSE SERPL-MCNC: 74 MG/DL (ref 65–99)
HDLC SERPL-MCNC: 47 MG/DL
LDLC SERPL CALC-MCNC: 120 MG/DL
POTASSIUM SERPL-SCNC: 4.5 MMOL/L (ref 3.6–5.5)
PROT SERPL-MCNC: 7.1 G/DL (ref 6–8.2)
SODIUM SERPL-SCNC: 141 MMOL/L (ref 135–145)
T3FREE SERPL-MCNC: 3.3 PG/ML (ref 2–4.4)
T4 FREE SERPL-MCNC: 1.14 NG/DL (ref 0.93–1.7)
TRIGL SERPL-MCNC: 265 MG/DL (ref 0–149)
TSH SERPL DL<=0.005 MIU/L-ACNC: 1.71 UIU/ML (ref 0.38–5.33)

## 2023-09-14 PROCEDURE — 84481 FREE ASSAY (FT-3): CPT

## 2023-09-14 PROCEDURE — 80061 LIPID PANEL: CPT

## 2023-09-14 PROCEDURE — 84443 ASSAY THYROID STIM HORMONE: CPT

## 2023-09-14 PROCEDURE — 80053 COMPREHEN METABOLIC PANEL: CPT

## 2023-09-14 PROCEDURE — 82306 VITAMIN D 25 HYDROXY: CPT

## 2023-09-14 PROCEDURE — 36415 COLL VENOUS BLD VENIPUNCTURE: CPT

## 2023-09-14 PROCEDURE — 84439 ASSAY OF FREE THYROXINE: CPT

## 2023-09-21 NOTE — TELEPHONE ENCOUNTER
Patient is okay paying 340 B pricing per telephone encounter above.    Signing encounter and no longer following denial.

## 2023-10-09 RX ORDER — ALBUTEROL SULFATE 90 UG/1
1 AEROSOL, METERED RESPIRATORY (INHALATION) EVERY 6 HOURS PRN
Qty: 6.7 G | Refills: 2 | Status: SHIPPED | OUTPATIENT
Start: 2023-10-09

## 2023-10-31 ENCOUNTER — OFFICE VISIT (OUTPATIENT)
Dept: URGENT CARE | Facility: PHYSICIAN GROUP | Age: 58
End: 2023-10-31
Payer: COMMERCIAL

## 2023-10-31 VITALS
OXYGEN SATURATION: 98 % | HEART RATE: 115 BPM | BODY MASS INDEX: 33.66 KG/M2 | HEIGHT: 66 IN | RESPIRATION RATE: 16 BRPM | DIASTOLIC BLOOD PRESSURE: 94 MMHG | SYSTOLIC BLOOD PRESSURE: 122 MMHG | TEMPERATURE: 99.4 F | WEIGHT: 209.44 LBS

## 2023-10-31 DIAGNOSIS — U07.1 COVID-19 VIRUS INFECTION: ICD-10-CM

## 2023-10-31 DIAGNOSIS — N30.01 ACUTE CYSTITIS WITH HEMATURIA: ICD-10-CM

## 2023-10-31 LAB
APPEARANCE UR: NORMAL
BILIRUB UR STRIP-MCNC: NEGATIVE MG/DL
COLOR UR AUTO: YELLOW
FLUAV RNA SPEC QL NAA+PROBE: NEGATIVE
FLUBV RNA SPEC QL NAA+PROBE: NEGATIVE
GLUCOSE UR STRIP.AUTO-MCNC: NEGATIVE MG/DL
KETONES UR STRIP.AUTO-MCNC: NEGATIVE MG/DL
LEUKOCYTE ESTERASE UR QL STRIP.AUTO: NORMAL
NITRITE UR QL STRIP.AUTO: POSITIVE
PH UR STRIP.AUTO: 7 [PH] (ref 5–8)
PROT UR QL STRIP: NORMAL MG/DL
RBC UR QL AUTO: NORMAL
RSV RNA SPEC QL NAA+PROBE: NEGATIVE
SARS-COV-2 RNA RESP QL NAA+PROBE: POSITIVE
SP GR UR STRIP.AUTO: 1.01
UROBILINOGEN UR STRIP-MCNC: 0.2 MG/DL

## 2023-10-31 PROCEDURE — 99213 OFFICE O/P EST LOW 20 MIN: CPT | Performed by: PHYSICIAN ASSISTANT

## 2023-10-31 PROCEDURE — 0241U POCT CEPHEID COV-2, FLU A/B, RSV - PCR: CPT | Performed by: PHYSICIAN ASSISTANT

## 2023-10-31 PROCEDURE — 3080F DIAST BP >= 90 MM HG: CPT | Performed by: PHYSICIAN ASSISTANT

## 2023-10-31 PROCEDURE — 3074F SYST BP LT 130 MM HG: CPT | Performed by: PHYSICIAN ASSISTANT

## 2023-10-31 PROCEDURE — 81002 URINALYSIS NONAUTO W/O SCOPE: CPT | Performed by: PHYSICIAN ASSISTANT

## 2023-10-31 RX ORDER — BENZONATATE 100 MG/1
100 CAPSULE ORAL 3 TIMES DAILY PRN
Qty: 60 CAPSULE | Refills: 0 | Status: SHIPPED | OUTPATIENT
Start: 2023-10-31

## 2023-10-31 RX ORDER — FLUTICASONE PROPIONATE 50 MCG
1 SPRAY, SUSPENSION (ML) NASAL DAILY
Qty: 16 G | Refills: 0 | Status: SHIPPED | OUTPATIENT
Start: 2023-10-31

## 2023-10-31 RX ORDER — SULFAMETHOXAZOLE AND TRIMETHOPRIM 800; 160 MG/1; MG/1
1 TABLET ORAL 2 TIMES DAILY
Qty: 6 TABLET | Refills: 0 | Status: SHIPPED | OUTPATIENT
Start: 2023-10-31 | End: 2023-11-03

## 2023-10-31 ASSESSMENT — ENCOUNTER SYMPTOMS
DIARRHEA: 0
FLANK PAIN: 0
DIZZINESS: 0
CONSTIPATION: 0
CARDIOVASCULAR NEGATIVE: 1
EYE REDNESS: 0
NAUSEA: 0
EYE DISCHARGE: 0
SORE THROAT: 1
DIAPHORESIS: 0
FEVER: 0
CHILLS: 0
EYE PAIN: 0
SHORTNESS OF BREATH: 0
COUGH: 1
WHEEZING: 0
SINUS PAIN: 0
VOMITING: 0
HEADACHES: 0
ABDOMINAL PAIN: 0
CONSTITUTIONAL NEGATIVE: 1

## 2023-10-31 ASSESSMENT — FIBROSIS 4 INDEX: FIB4 SCORE: 0.98

## 2023-10-31 NOTE — PROGRESS NOTES
"  Subjective:     Sheron Le  is a 58 y.o. female who presents for UTI (C/O painful urination and blood in urine x this morning./Also thinks she has COVID. C/O congestion, sore throat, body aches x 3 days.)       She presents today with multiple complaints.  First complaint is of a possible urinary tract infection.  Is having painful urination as well as blood in the urine this morning.  Denies fevers, no flank pain.  No vaginal pain bleeding or discharge.  Has had UTIs in the past I do feel similar to her current symptoms.    Also presents today with concern of possible COVID infection.  Is having sinus congestion and rhinorrhea, sore throat and cough.  She associates a sore throat to her severity of cough.  Denies any recent known close sick contacts.  No chest pain or shortness of breath, no nausea or vomiting, no abdominal pain, no diarrhea.  Has been using over-the-counter medications for symptoms.       Review of Systems   Constitutional: Negative.  Negative for chills, diaphoresis, fever and malaise/fatigue.   HENT:  Positive for congestion and sore throat. Negative for ear discharge and sinus pain.    Eyes:  Negative for pain, discharge and redness.   Respiratory:  Positive for cough. Negative for shortness of breath and wheezing.    Cardiovascular: Negative.  Negative for chest pain.   Gastrointestinal:  Negative for abdominal pain, constipation, diarrhea, nausea and vomiting.   Genitourinary:  Positive for dysuria and hematuria. Negative for flank pain, frequency and urgency.   Neurological:  Negative for dizziness and headaches.      Allergies   Allergen Reactions    Aspirin Swelling     Face swelling     History reviewed. No pertinent past medical history.     Objective:   BP (!) 122/94 (BP Location: Left arm, Patient Position: Sitting, BP Cuff Size: Adult)   Pulse (!) 115   Temp 37.4 °C (99.4 °F) (Temporal)   Resp 16   Ht 1.676 m (5' 6\")   Wt 95 kg (209 lb 7 oz)   SpO2 98%   BMI 33.80 " kg/m²   Physical Exam  Vitals and nursing note reviewed.   Constitutional:       General: She is not in acute distress.     Appearance: Normal appearance. She is not ill-appearing, toxic-appearing or diaphoretic.   HENT:      Head: Normocephalic.      Right Ear: Tympanic membrane, ear canal and external ear normal. There is no impacted cerumen.      Left Ear: Tympanic membrane, ear canal and external ear normal. There is no impacted cerumen.      Nose: No congestion or rhinorrhea.      Mouth/Throat:      Mouth: Mucous membranes are moist.      Pharynx: No oropharyngeal exudate or posterior oropharyngeal erythema.   Eyes:      General:         Right eye: No discharge.         Left eye: No discharge.      Conjunctiva/sclera: Conjunctivae normal.   Cardiovascular:      Rate and Rhythm: Normal rate and regular rhythm.   Pulmonary:      Effort: Pulmonary effort is normal. No respiratory distress.      Breath sounds: Normal breath sounds. No stridor. No wheezing or rhonchi.   Abdominal:      General: Abdomen is flat. Bowel sounds are normal. There is no distension.      Palpations: Abdomen is soft.      Tenderness: There is no abdominal tenderness. There is no right CVA tenderness, left CVA tenderness, guarding or rebound.   Musculoskeletal:      Cervical back: Neck supple.   Lymphadenopathy:      Cervical: No cervical adenopathy.   Neurological:      General: No focal deficit present.      Mental Status: She is alert and oriented to person, place, and time.   Psychiatric:         Mood and Affect: Mood normal.         Behavior: Behavior normal.         Thought Content: Thought content normal.         Judgment: Judgment normal.             Diagnostic testing:    POC urinalysis-large blood, trace proteins, positive nitrites, large leukocytes, all others within normal limits    Cephid COVID/Influenza/RSV -positive COVID, all others negative, notified via [employer] message    Assessment/Plan:     Encounter Diagnoses   Name  Primary?    Acute cystitis with hematuria     COVID-19 virus infection           Plan for care for today's complaint includes starting the patient on Bactrim for her urinary tract infection based on symptom presentation and urinalysis results.  Flonase and Tessalon Perles were sent for COVID-19 symptom support.  Vital signs are stable patient was well-appearing today.  Continue to monitor symptoms and return to urgent care or follow-up with primary care provider if symptoms remain ongoing.  Follow-up in the emergency department if symptoms become severe, ER precautions discussed in office today..  Prescription for Bactrim, Flonase, Tessalon Perles provided.    See AVS Instructions below for written guidance provided to patient on after-visit management and care in addition to our verbal discussion during the visit.    Please note that this dictation was created using voice recognition software. I have attempted to correct all errors, but there may be sound-alike, spelling, grammar and possibly content errors that I did not discover before finalizing the note.    Praveen Garber PA-C

## 2023-11-22 ENCOUNTER — HOSPITAL ENCOUNTER (OUTPATIENT)
Dept: RADIOLOGY | Facility: MEDICAL CENTER | Age: 58
End: 2023-11-22
Attending: INTERNAL MEDICINE
Payer: COMMERCIAL

## 2023-11-22 DIAGNOSIS — Z12.31 VISIT FOR SCREENING MAMMOGRAM: ICD-10-CM

## 2023-11-22 PROCEDURE — 77063 BREAST TOMOSYNTHESIS BI: CPT

## 2023-12-18 DIAGNOSIS — E66.9 OBESITY (BMI 30-39.9): ICD-10-CM

## 2023-12-18 RX ORDER — TIRZEPATIDE 2.5 MG/.5ML
2.5 INJECTION, SOLUTION SUBCUTANEOUS
Qty: 2 ML | Refills: 1 | Status: SHIPPED | OUTPATIENT
Start: 2023-12-18 | End: 2023-12-19

## 2023-12-19 DIAGNOSIS — E66.9 OBESITY (BMI 30-39.9): ICD-10-CM

## 2023-12-19 RX ORDER — TIRZEPATIDE 2.5 MG/.5ML
2.5 INJECTION, SOLUTION SUBCUTANEOUS
Qty: 2 ML | Refills: 2 | Status: SHIPPED | OUTPATIENT
Start: 2023-12-19 | End: 2024-01-05

## 2023-12-19 RX ORDER — TIRZEPATIDE 2.5 MG/.5ML
2.5 INJECTION, SOLUTION SUBCUTANEOUS
Qty: 2 ML | Refills: 1 | Status: SHIPPED | OUTPATIENT
Start: 2023-12-19 | End: 2023-12-19

## 2024-01-02 ENCOUNTER — TELEPHONE (OUTPATIENT)
Dept: RESEARCH | Facility: MEDICAL CENTER | Age: 59
End: 2024-01-02
Payer: COMMERCIAL

## 2024-01-05 ENCOUNTER — TELEPHONE (OUTPATIENT)
Dept: ENDOCRINOLOGY | Facility: MEDICAL CENTER | Age: 59
End: 2024-01-05
Payer: COMMERCIAL

## 2024-01-05 DIAGNOSIS — E66.9 OBESITY (BMI 30-39.9): ICD-10-CM

## 2024-01-05 RX ORDER — TIRZEPATIDE 2.5 MG/.5ML
2.5 INJECTION, SOLUTION SUBCUTANEOUS
Qty: 2 ML | Refills: 1 | Status: SHIPPED | OUTPATIENT
Start: 2024-01-05 | End: 2024-02-28

## 2024-02-25 DIAGNOSIS — E05.00 GRAVES DISEASE: ICD-10-CM

## 2024-02-25 DIAGNOSIS — E05.90 HYPERTHYROIDISM: ICD-10-CM

## 2024-02-26 ENCOUNTER — HOSPITAL ENCOUNTER (OUTPATIENT)
Dept: LAB | Facility: MEDICAL CENTER | Age: 59
End: 2024-02-26
Attending: INTERNAL MEDICINE
Payer: COMMERCIAL

## 2024-02-26 DIAGNOSIS — E05.00 GRAVES DISEASE: ICD-10-CM

## 2024-02-26 DIAGNOSIS — E05.90 HYPERTHYROIDISM: ICD-10-CM

## 2024-02-26 LAB
ALBUMIN SERPL BCP-MCNC: 4.6 G/DL (ref 3.2–4.9)
ALBUMIN/GLOB SERPL: 1.4 G/DL
ALP SERPL-CCNC: 95 U/L (ref 30–99)
ALT SERPL-CCNC: 15 U/L (ref 2–50)
ANION GAP SERPL CALC-SCNC: 13 MMOL/L (ref 7–16)
AST SERPL-CCNC: 19 U/L (ref 12–45)
BASOPHILS # BLD AUTO: 0.8 % (ref 0–1.8)
BASOPHILS # BLD: 0.05 K/UL (ref 0–0.12)
BILIRUB SERPL-MCNC: 0.3 MG/DL (ref 0.1–1.5)
BUN SERPL-MCNC: 10 MG/DL (ref 8–22)
CALCIUM ALBUM COR SERPL-MCNC: 9.5 MG/DL (ref 8.5–10.5)
CALCIUM SERPL-MCNC: 10 MG/DL (ref 8.5–10.5)
CHLORIDE SERPL-SCNC: 102 MMOL/L (ref 96–112)
CO2 SERPL-SCNC: 24 MMOL/L (ref 20–33)
CREAT SERPL-MCNC: 0.71 MG/DL (ref 0.5–1.4)
EOSINOPHIL # BLD AUTO: 0.11 K/UL (ref 0–0.51)
EOSINOPHIL NFR BLD: 1.7 % (ref 0–6.9)
ERYTHROCYTE [DISTWIDTH] IN BLOOD BY AUTOMATED COUNT: 40.8 FL (ref 35.9–50)
GFR SERPLBLD CREATININE-BSD FMLA CKD-EPI: 99 ML/MIN/1.73 M 2
GLOBULIN SER CALC-MCNC: 3.2 G/DL (ref 1.9–3.5)
GLUCOSE SERPL-MCNC: 87 MG/DL (ref 65–99)
HCT VFR BLD AUTO: 45.4 % (ref 37–47)
HGB BLD-MCNC: 15.1 G/DL (ref 12–16)
IMM GRANULOCYTES # BLD AUTO: 0.01 K/UL (ref 0–0.11)
IMM GRANULOCYTES NFR BLD AUTO: 0.2 % (ref 0–0.9)
LYMPHOCYTES # BLD AUTO: 1.81 K/UL (ref 1–4.8)
LYMPHOCYTES NFR BLD: 28.1 % (ref 22–41)
MCH RBC QN AUTO: 29.6 PG (ref 27–33)
MCHC RBC AUTO-ENTMCNC: 33.3 G/DL (ref 32.2–35.5)
MCV RBC AUTO: 89 FL (ref 81.4–97.8)
MONOCYTES # BLD AUTO: 0.44 K/UL (ref 0–0.85)
MONOCYTES NFR BLD AUTO: 6.8 % (ref 0–13.4)
NEUTROPHILS # BLD AUTO: 4.03 K/UL (ref 1.82–7.42)
NEUTROPHILS NFR BLD: 62.4 % (ref 44–72)
NRBC # BLD AUTO: 0 K/UL
NRBC BLD-RTO: 0 /100 WBC (ref 0–0.2)
PLATELET # BLD AUTO: 301 K/UL (ref 164–446)
PMV BLD AUTO: 11.1 FL (ref 9–12.9)
POTASSIUM SERPL-SCNC: 4.2 MMOL/L (ref 3.6–5.5)
PROT SERPL-MCNC: 7.8 G/DL (ref 6–8.2)
RBC # BLD AUTO: 5.1 M/UL (ref 4.2–5.4)
SODIUM SERPL-SCNC: 139 MMOL/L (ref 135–145)
T3FREE SERPL-MCNC: 2.56 PG/ML (ref 2–4.4)
T4 FREE SERPL-MCNC: 1.33 NG/DL (ref 0.93–1.7)
TSH SERPL DL<=0.005 MIU/L-ACNC: 1.79 UIU/ML (ref 0.38–5.33)
WBC # BLD AUTO: 6.5 K/UL (ref 4.8–10.8)

## 2024-02-26 PROCEDURE — 36415 COLL VENOUS BLD VENIPUNCTURE: CPT

## 2024-02-26 PROCEDURE — 83520 IMMUNOASSAY QUANT NOS NONAB: CPT

## 2024-02-26 PROCEDURE — 84481 FREE ASSAY (FT-3): CPT

## 2024-02-26 PROCEDURE — 84443 ASSAY THYROID STIM HORMONE: CPT

## 2024-02-26 PROCEDURE — 85025 COMPLETE CBC W/AUTO DIFF WBC: CPT

## 2024-02-26 PROCEDURE — 84439 ASSAY OF FREE THYROXINE: CPT

## 2024-02-26 PROCEDURE — 80053 COMPREHEN METABOLIC PANEL: CPT

## 2024-02-26 PROCEDURE — 84445 ASSAY OF TSI GLOBULIN: CPT

## 2024-02-28 ENCOUNTER — TELEMEDICINE (OUTPATIENT)
Dept: ENDOCRINOLOGY | Facility: MEDICAL CENTER | Age: 59
End: 2024-02-28
Attending: INTERNAL MEDICINE
Payer: COMMERCIAL

## 2024-02-28 VITALS — WEIGHT: 198 LBS | HEIGHT: 66 IN | BODY MASS INDEX: 31.82 KG/M2

## 2024-02-28 DIAGNOSIS — E66.9 OBESITY (BMI 30-39.9): ICD-10-CM

## 2024-02-28 DIAGNOSIS — E05.90 HYPERTHYROIDISM: ICD-10-CM

## 2024-02-28 DIAGNOSIS — E55.9 VITAMIN D DEFICIENCY: ICD-10-CM

## 2024-02-28 DIAGNOSIS — E05.00 GRAVES DISEASE: ICD-10-CM

## 2024-02-28 LAB
TSH RECEP AB SER-ACNC: <1.1 IU/L
TSI SER-ACNC: <0.1 IU/L

## 2024-02-28 PROCEDURE — 99214 OFFICE O/P EST MOD 30 MIN: CPT | Mod: 95 | Performed by: INTERNAL MEDICINE

## 2024-02-28 RX ORDER — TIRZEPATIDE 5 MG/.5ML
5 INJECTION, SOLUTION SUBCUTANEOUS
Qty: 2 ML | Refills: 1 | Status: SHIPPED | OUTPATIENT
Start: 2024-02-28

## 2024-02-28 ASSESSMENT — FIBROSIS 4 INDEX: FIB4 SCORE: 0.93

## 2024-02-28 NOTE — PROGRESS NOTES
Chief Complaint: Follow up for Hyperthyroidism secondary to Grave's disease in remission.   Patient was presented for a telehealth consultation via secure and encrypted videoconferencing technology.   This encounter was conducted via Zoom . Verbal consent was obtained. Patient's identity was verified.    Virtual visit consent       This evaluation was conducted via Zoom using secure and encrypted videoconferencing technology.   The patient was (home or other private:[id number]) in the [facility].   The patient's identity was confirmed and verbal consent was obtained for this virtual visit.         HPI:     Sheron Le is a 58 y.o. female here for follow up of Hyperthyroidism.      She was previously seen by the nurse practitioner and is my first time meeting her    She was diagnosed with Graves' hyperthyroidism in July 2021.    Her baseline TSI and thyrotropin receptor antibodies were positive  Her formal thyroid ultrasound in the office in July 2022 showed no focal nodules      She remains on methimazole 2.5 mg every other day  She reports good compliance  She denies unexplained weight loss, tremors, palpitations and insomnia   She denies symptoms of thyroid eye disease      Her labs are stable and the autoantibodies that were elevated are now normal and we discussed enduring  remission      Latest Reference Range & Units 02/26/24 12:03   TSH 0.380 - 5.330 uIU/mL 1.790   Free T-4 0.93 - 1.70 ng/dL 1.33   T3,Free 2.00 - 4.40 pg/mL 2.56         Additionally she has obesity with a BMI of 34 baseline wt 206lbs  She denies HTN, ANUJ   She is unsure if she has hyperlipidemia  She tried and failed diet and exercise  She tried phentermine due to dry mouth  Her insurance didn't cover Wegovy  She is now on Zepbound 2.5mg weekly started on Jan 2024  She has lost 10 lbs  ( current weight 195 ;lbs,  baseline weight 206)  She is combining it with diet and exercise   She denies SE      Patient's medications, allergies, and  "social histories were reviewed and updated as appropriate.      ROS:     CONS:     No fever, no chills   EYES:     No diplopia, no blurry vision   CV:           No chest pain, no palpitations   PULM:     No SOB, no cough, no hemoptysis.   GI:            No nausea, no vomiting, no diarrhea, no constipation   ENDO:     No polyuria, no polydipsia, no heat intolerance, no cold intolerance       Past Medical History:  Problem List:  2021-10: Facial lesion  2021-07: Graves disease  2021-03: Hyperthyroidism  2021-03: Mixed hyperlipidemia  2021-03: WBC decreased  2021-02: Menopausal state  2021-02: Mild intermittent asthma without complication  2020-06: Tenderness  2020-01: Headache  2018-06: Bilateral pes planus  2018-06: Gastrocnemius equinus of left lower extremity  2018-06: Gastrocnemius equinus, right  2017-03: Foot pain, right      Past Surgical History:  Past Surgical History:   Procedure Laterality Date    OVARIAN CYSTECTOMY  1993        Allergies:  Aspirin     Social History:  Social History     Tobacco Use    Smoking status: Never    Smokeless tobacco: Never   Vaping Use    Vaping Use: Never used   Substance Use Topics    Alcohol use: Yes     Comment: 1-2 drinks per month    Drug use: Never        Family History:   family history includes Cancer in her father and mother; Diabetes in her maternal great-grandmother; Heart Disease in her mother.      PHYSICAL EXAM:   Vital signs: Ht 1.676 m (5' 6\")   Wt 89.8 kg (198 lb)   BMI 31.96 kg/m²   GENERAL: Well-developed, well-nourished in no apparent distress.   EYE:  No ocular asymmetry, PERRLA, No exophthalmos or lid lag  HENT: Pink, moist mucous membranes.    NECK: No thyromegaly.   CARDIOVASCULAR:  No murmurs  LUNGS: Clear breath sounds  ABDOMEN: Soft, nontender   EXTREMITIES: No clubbing, cyanosis, or edema.   NEUROLOGICAL: No gross focal motor abnormalities, No visible tremors with both hands  LYMPH: No cervical adenopathy seen  SKIN: No rashes, lesions. "     Labs:  Lab Results   Component Value Date/Time    WBC 6.5 02/26/2024 12:03 PM    RBC 5.10 02/26/2024 12:03 PM    HEMOGLOBIN 15.1 02/26/2024 12:03 PM    MCV 89.0 02/26/2024 12:03 PM    MCH 29.6 02/26/2024 12:03 PM    MCHC 33.3 02/26/2024 12:03 PM    RDW 40.8 02/26/2024 12:03 PM    MPV 11.1 02/26/2024 12:03 PM       Lab Results   Component Value Date/Time    SODIUM 139 02/26/2024 12:03 PM    POTASSIUM 4.2 02/26/2024 12:03 PM    CHLORIDE 102 02/26/2024 12:03 PM    CO2 24 02/26/2024 12:03 PM    ANION 13.0 02/26/2024 12:03 PM    GLUCOSE 87 02/26/2024 12:03 PM    BUN 10 02/26/2024 12:03 PM    CREATININE 0.71 02/26/2024 12:03 PM    CALCIUM 10.0 02/26/2024 12:03 PM    ASTSGOT 19 02/26/2024 12:03 PM    ALTSGPT 15 02/26/2024 12:03 PM    TBILIRUBIN 0.3 02/26/2024 12:03 PM    ALBUMIN 4.6 02/26/2024 12:03 PM    TOTPROTEIN 7.8 02/26/2024 12:03 PM    GLOBULIN 3.2 02/26/2024 12:03 PM    AGRATIO 1.4 02/26/2024 12:03 PM       Lab Results   Component Value Date/Time    TSHULTRASEN 2.780 07/15/2022 1056     Lab Results   Component Value Date/Time    FREET4 0.97 07/15/2022 1056     Lab Results   Component Value Date/Time    FREET3 2.48 07/15/2022 1056     No results found for: THYSTIMIG      Imaging:      ASSESSMENT/PLAN:     1. Hyperthyroidism  Stable  We discussed remission of her Graves'  Overall she is has stable remission  There is no evidence of recurrence biochemically  She should notify me if she has recurrence of symptoms of hyperthyroidism or Graves' disease which includes unexplained weight loss, tremors, palpitations, heat intolerance and anxiety  Follow-up in 7 months      2. High risk medication use  Patient was taking methimazole which is high risk medication  Rarely methimazole can cause leukopenia and liver enzyme abnormalities  We will continue to monitor her CBC and CMP periodically while she is on the medication     3. Graves disease  This is the etiology of her hyperthyroidism is proven by her elevated thyroid  autoantibodies for Graves' disease    4. Vitamin D deficiency  Stable   Vitamin D labs were reviewed with patient  Continue current supplements  Continue monitoring levels       5. Obesity (BMI 30-39.9)  Improved  She is obese with BMI > 30  She tried and failed  6 months of a comprehensive weight loss program  She had a negative endocrine work up for obesity  She tried and failed phentermine  Wegovy was not covered by insurance  She is on Zepbound 2.5 mg weekly and reports greater than 5% weight loss from baseline.  She is combining it with diet and exercise  She denies side effects  Increase dose to 5 mg weekly  She will contact me every month so I can go up on the dose to target maximum tolerated dose of 10 or 12.5 or 15 mg weekly  Will continue to monitor her weight        No follow-ups on file.        Thank you kindly for allowing me to participate in the thyroid care plan for this patient.    Ton Frausto MD, FACE, [facility]      CC:   Lakesha Antonio M.D.

## 2024-09-24 ENCOUNTER — HOSPITAL ENCOUNTER (OUTPATIENT)
Dept: LAB | Facility: MEDICAL CENTER | Age: 59
End: 2024-09-24
Attending: INTERNAL MEDICINE
Payer: COMMERCIAL

## 2024-09-24 DIAGNOSIS — E05.90 HYPERTHYROIDISM: ICD-10-CM

## 2024-09-24 DIAGNOSIS — E55.9 VITAMIN D DEFICIENCY: ICD-10-CM

## 2024-09-24 DIAGNOSIS — E66.9 OBESITY (BMI 30-39.9): ICD-10-CM

## 2024-09-24 DIAGNOSIS — E05.00 GRAVES DISEASE: ICD-10-CM

## 2024-09-24 LAB
25(OH)D3 SERPL-MCNC: 38 NG/ML (ref 30–100)
T3FREE SERPL-MCNC: 2.41 PG/ML (ref 2–4.4)
T4 FREE SERPL-MCNC: 1.05 NG/DL (ref 0.93–1.7)
TSH SERPL-ACNC: 1.71 UIU/ML (ref 0.35–5.5)

## 2024-09-24 PROCEDURE — 84439 ASSAY OF FREE THYROXINE: CPT

## 2024-09-24 PROCEDURE — 82306 VITAMIN D 25 HYDROXY: CPT

## 2024-09-24 PROCEDURE — 84481 FREE ASSAY (FT-3): CPT

## 2024-09-24 PROCEDURE — 84443 ASSAY THYROID STIM HORMONE: CPT

## 2024-09-24 PROCEDURE — 80053 COMPREHEN METABOLIC PANEL: CPT

## 2024-09-24 PROCEDURE — 36415 COLL VENOUS BLD VENIPUNCTURE: CPT

## 2024-09-25 LAB
ALBUMIN SERPL BCP-MCNC: 4.4 G/DL (ref 3.2–4.9)
ALBUMIN/GLOB SERPL: 1.4 G/DL
ALP SERPL-CCNC: 86 U/L (ref 30–99)
ALT SERPL-CCNC: 15 U/L (ref 2–50)
ANION GAP SERPL CALC-SCNC: 16 MMOL/L (ref 7–16)
AST SERPL-CCNC: 23 U/L (ref 12–45)
BILIRUB SERPL-MCNC: 0.3 MG/DL (ref 0.1–1.5)
BUN SERPL-MCNC: 12 MG/DL (ref 8–22)
CALCIUM ALBUM COR SERPL-MCNC: 9.9 MG/DL (ref 8.5–10.5)
CALCIUM SERPL-MCNC: 10.2 MG/DL (ref 8.5–10.5)
CHLORIDE SERPL-SCNC: 99 MMOL/L (ref 96–112)
CO2 SERPL-SCNC: 22 MMOL/L (ref 20–33)
CREAT SERPL-MCNC: 0.68 MG/DL (ref 0.5–1.4)
GFR SERPLBLD CREATININE-BSD FMLA CKD-EPI: 101 ML/MIN/1.73 M 2
GLOBULIN SER CALC-MCNC: 3.1 G/DL (ref 1.9–3.5)
GLUCOSE SERPL-MCNC: 84 MG/DL (ref 65–99)
POTASSIUM SERPL-SCNC: 4.2 MMOL/L (ref 3.6–5.5)
PROT SERPL-MCNC: 7.5 G/DL (ref 6–8.2)
SODIUM SERPL-SCNC: 137 MMOL/L (ref 135–145)

## 2024-10-01 ENCOUNTER — TELEMEDICINE (OUTPATIENT)
Dept: ENDOCRINOLOGY | Facility: MEDICAL CENTER | Age: 59
End: 2024-10-01
Attending: INTERNAL MEDICINE
Payer: COMMERCIAL

## 2024-10-01 VITALS — BODY MASS INDEX: 30.53 KG/M2 | WEIGHT: 190 LBS | HEIGHT: 66 IN

## 2024-10-01 DIAGNOSIS — E66.9 OBESITY (BMI 30-39.9): ICD-10-CM

## 2024-10-01 DIAGNOSIS — E05.90 HYPERTHYROIDISM: ICD-10-CM

## 2024-10-01 DIAGNOSIS — E55.9 VITAMIN D DEFICIENCY: ICD-10-CM

## 2024-10-01 DIAGNOSIS — Z79.899 HIGH RISK MEDICATION USE: ICD-10-CM

## 2024-10-01 DIAGNOSIS — E05.00 GRAVES DISEASE: ICD-10-CM

## 2024-10-01 PROCEDURE — 99214 OFFICE O/P EST MOD 30 MIN: CPT | Mod: 95 | Performed by: INTERNAL MEDICINE

## 2024-10-01 ASSESSMENT — FIBROSIS 4 INDEX: FIB4 SCORE: 1.14

## 2024-11-02 ENCOUNTER — HOSPITAL ENCOUNTER (OUTPATIENT)
Facility: MEDICAL CENTER | Age: 59
End: 2024-11-02
Attending: FAMILY MEDICINE
Payer: COMMERCIAL

## 2024-11-02 ENCOUNTER — OFFICE VISIT (OUTPATIENT)
Dept: URGENT CARE | Facility: PHYSICIAN GROUP | Age: 59
End: 2024-11-02
Payer: COMMERCIAL

## 2024-11-02 VITALS
HEIGHT: 66 IN | TEMPERATURE: 99.6 F | BODY MASS INDEX: 31.68 KG/M2 | OXYGEN SATURATION: 99 % | SYSTOLIC BLOOD PRESSURE: 114 MMHG | DIASTOLIC BLOOD PRESSURE: 72 MMHG | HEART RATE: 89 BPM | WEIGHT: 197.09 LBS | RESPIRATION RATE: 18 BRPM

## 2024-11-02 DIAGNOSIS — N30.01 ACUTE CYSTITIS WITH HEMATURIA: ICD-10-CM

## 2024-11-02 LAB
APPEARANCE UR: NORMAL
BILIRUB UR STRIP-MCNC: NEGATIVE MG/DL
COLOR UR AUTO: NORMAL
GLUCOSE UR STRIP.AUTO-MCNC: NEGATIVE MG/DL
KETONES UR STRIP.AUTO-MCNC: NEGATIVE MG/DL
LEUKOCYTE ESTERASE UR QL STRIP.AUTO: NORMAL
NITRITE UR QL STRIP.AUTO: NEGATIVE
PH UR STRIP.AUTO: 7 [PH] (ref 5–8)
PROT UR QL STRIP: 30 MG/DL
RBC UR QL AUTO: NORMAL
SP GR UR STRIP.AUTO: 1.01
UROBILINOGEN UR STRIP-MCNC: NORMAL MG/DL

## 2024-11-02 PROCEDURE — 99213 OFFICE O/P EST LOW 20 MIN: CPT | Performed by: FAMILY MEDICINE

## 2024-11-02 PROCEDURE — 3074F SYST BP LT 130 MM HG: CPT | Performed by: FAMILY MEDICINE

## 2024-11-02 PROCEDURE — 87186 SC STD MICRODIL/AGAR DIL: CPT

## 2024-11-02 PROCEDURE — 87077 CULTURE AEROBIC IDENTIFY: CPT

## 2024-11-02 PROCEDURE — 81002 URINALYSIS NONAUTO W/O SCOPE: CPT | Performed by: FAMILY MEDICINE

## 2024-11-02 PROCEDURE — 87086 URINE CULTURE/COLONY COUNT: CPT

## 2024-11-02 PROCEDURE — 3078F DIAST BP <80 MM HG: CPT | Performed by: FAMILY MEDICINE

## 2024-11-02 RX ORDER — NITROFURANTOIN 25; 75 MG/1; MG/1
100 CAPSULE ORAL 2 TIMES DAILY
Qty: 10 CAPSULE | Refills: 0 | Status: SHIPPED | OUTPATIENT
Start: 2024-11-02 | End: 2024-11-07

## 2024-11-02 ASSESSMENT — ENCOUNTER SYMPTOMS
ABDOMINAL PAIN: 0
FEVER: 0
FLANK PAIN: 0

## 2024-11-02 ASSESSMENT — FIBROSIS 4 INDEX: FIB4 SCORE: 1.14

## 2024-11-02 NOTE — PROGRESS NOTES
Subjective:     Sheron Le is a 59 y.o. female who presents for UTI (No pain. Pressure in pelvic region, X last night. Urgency.)    HPI  Pt presents for evaluation of an acute problem  Pt with new urinary urgency, frequency, and hematuria sice last night   No abd pain or flank pain   No fevers, nausea, vomiting   Minimal dysuria     Review of Systems   Constitutional:  Negative for fever.   Gastrointestinal:  Negative for abdominal pain.   Genitourinary:  Positive for frequency, hematuria and urgency. Negative for flank pain.       PMH:  has no past medical history on file.  MEDS:   Current Outpatient Medications:     nitrofurantoin (MACROBID) 100 MG Cap, Take 1 Capsule by mouth 2 times a day for 5 days., Disp: 10 Capsule, Rfl: 0    Tirzepatide-Weight Management (ZEPBOUND) 5 MG/0.5ML Solution Auto-injector, Inject 5 mg under the skin every 7 days., Disp: 2 mL, Rfl: 1    albuterol 108 (90 Base) MCG/ACT Aero Soln inhalation aerosol, Inhale 1 Puff every 6 hours as needed for Shortness of Breath., Disp: 6.7 g, Rfl: 2    Cholecalciferol (VITAMIN D3) 125 MCG (5000 UT) Tab, Take  by mouth., Disp: , Rfl:     rizatriptan (MAXALT) 5 MG tablet, Take 1 Tablet by mouth one time as needed for Migraine for up to 1 dose. May repeat dose once, one hour after initial dose., Disp: 10 Tablet, Rfl: 3    Multiple Vitamins-Minerals (ZINC PO), Take 2 Tablets by mouth every day., Disp: , Rfl:     Ascorbic Acid (VITAMIN C PO), Take 1 Tablet by mouth every day., Disp: , Rfl:   ALLERGIES:   Allergies   Allergen Reactions    Aspirin Swelling     Face swelling     SURGHX:   Past Surgical History:   Procedure Laterality Date    OVARIAN CYSTECTOMY  1993     SOCHX:  reports that she has never smoked. She has never used smokeless tobacco. She reports that she does not currently use alcohol. She reports that she does not use drugs.     Objective:   /72 (BP Location: Right arm, Patient Position: Sitting, BP Cuff Size: Adult long)    "Pulse 89   Temp 37.6 °C (99.6 °F) (Temporal)   Resp 18   Ht 1.676 m (5' 6\")   Wt 89.4 kg (197 lb 1.5 oz)   SpO2 99%   BMI 31.81 kg/m²     Physical Exam  Constitutional:       General: She is not in acute distress.     Appearance: She is well-developed. She is not diaphoretic.   Pulmonary:      Effort: Pulmonary effort is normal.   Abdominal:      General: Abdomen is flat. There is no distension.      Palpations: Abdomen is soft.      Tenderness: There is no right CVA tenderness, left CVA tenderness, guarding or rebound.      Comments: Mild tenderness in the suprapubic region   Neurological:      Mental Status: She is alert.       Assessment/Plan:   Assessment    1. Acute cystitis with hematuria  - POCT Urinalysis  - nitrofurantoin (MACROBID) 100 MG Cap; Take 1 Capsule by mouth 2 times a day for 5 days.  Dispense: 10 Capsule; Refill: 0  - URINE CULTURE(NEW); Future    Patient with acute cystitis.  Treat with nitrofurantoin and send urine culture.  Has no signs of pyelonephritis at this time.  Follow-up culture results.    "

## 2025-02-12 SDOH — ECONOMIC STABILITY: INCOME INSECURITY: IN THE LAST 12 MONTHS, WAS THERE A TIME WHEN YOU WERE NOT ABLE TO PAY THE MORTGAGE OR RENT ON TIME?: NO

## 2025-02-12 SDOH — HEALTH STABILITY: PHYSICAL HEALTH: ON AVERAGE, HOW MANY DAYS PER WEEK DO YOU ENGAGE IN MODERATE TO STRENUOUS EXERCISE (LIKE A BRISK WALK)?: 1 DAY

## 2025-02-12 SDOH — ECONOMIC STABILITY: FOOD INSECURITY: WITHIN THE PAST 12 MONTHS, THE FOOD YOU BOUGHT JUST DIDN'T LAST AND YOU DIDN'T HAVE MONEY TO GET MORE.: NEVER TRUE

## 2025-02-12 SDOH — ECONOMIC STABILITY: INCOME INSECURITY: HOW HARD IS IT FOR YOU TO PAY FOR THE VERY BASICS LIKE FOOD, HOUSING, MEDICAL CARE, AND HEATING?: NOT HARD AT ALL

## 2025-02-12 SDOH — ECONOMIC STABILITY: FOOD INSECURITY: WITHIN THE PAST 12 MONTHS, YOU WORRIED THAT YOUR FOOD WOULD RUN OUT BEFORE YOU GOT MONEY TO BUY MORE.: NEVER TRUE

## 2025-02-12 SDOH — HEALTH STABILITY: PHYSICAL HEALTH: ON AVERAGE, HOW MANY MINUTES DO YOU ENGAGE IN EXERCISE AT THIS LEVEL?: 60 MIN

## 2025-02-12 SDOH — ECONOMIC STABILITY: TRANSPORTATION INSECURITY
IN THE PAST 12 MONTHS, HAS THE LACK OF TRANSPORTATION KEPT YOU FROM MEDICAL APPOINTMENTS OR FROM GETTING MEDICATIONS?: NO

## 2025-02-12 SDOH — ECONOMIC STABILITY: TRANSPORTATION INSECURITY
IN THE PAST 12 MONTHS, HAS LACK OF TRANSPORTATION KEPT YOU FROM MEETINGS, WORK, OR FROM GETTING THINGS NEEDED FOR DAILY LIVING?: NO

## 2025-02-12 SDOH — HEALTH STABILITY: MENTAL HEALTH
STRESS IS WHEN SOMEONE FEELS TENSE, NERVOUS, ANXIOUS, OR CAN'T SLEEP AT NIGHT BECAUSE THEIR MIND IS TROUBLED. HOW STRESSED ARE YOU?: NOT AT ALL

## 2025-02-12 SDOH — ECONOMIC STABILITY: HOUSING INSECURITY
IN THE LAST 12 MONTHS, WAS THERE A TIME WHEN YOU DID NOT HAVE A STEADY PLACE TO SLEEP OR SLEPT IN A SHELTER (INCLUDING NOW)?: NO

## 2025-02-12 SDOH — ECONOMIC STABILITY: TRANSPORTATION INSECURITY
IN THE PAST 12 MONTHS, HAS LACK OF RELIABLE TRANSPORTATION KEPT YOU FROM MEDICAL APPOINTMENTS, MEETINGS, WORK OR FROM GETTING THINGS NEEDED FOR DAILY LIVING?: NO

## 2025-02-12 ASSESSMENT — SOCIAL DETERMINANTS OF HEALTH (SDOH)
IN A TYPICAL WEEK, HOW MANY TIMES DO YOU TALK ON THE PHONE WITH FAMILY, FRIENDS, OR NEIGHBORS?: TWICE A WEEK
HOW OFTEN DO YOU GET TOGETHER WITH FRIENDS OR RELATIVES?: ONCE A WEEK
HOW OFTEN DO YOU HAVE SIX OR MORE DRINKS ON ONE OCCASION: NEVER
WITHIN THE PAST 12 MONTHS, YOU WORRIED THAT YOUR FOOD WOULD RUN OUT BEFORE YOU GOT THE MONEY TO BUY MORE: NEVER TRUE
HOW HARD IS IT FOR YOU TO PAY FOR THE VERY BASICS LIKE FOOD, HOUSING, MEDICAL CARE, AND HEATING?: NOT HARD AT ALL
HOW MANY DRINKS CONTAINING ALCOHOL DO YOU HAVE ON A TYPICAL DAY WHEN YOU ARE DRINKING: 1 OR 2
HOW OFTEN DO YOU HAVE A DRINK CONTAINING ALCOHOL: MONTHLY OR LESS
IN THE PAST 12 MONTHS, HAS THE ELECTRIC, GAS, OIL, OR WATER COMPANY THREATENED TO SHUT OFF SERVICE IN YOUR HOME?: NO
HOW OFTEN DO YOU ATTEND CHURCH OR RELIGIOUS SERVICES?: MORE THAN 4 TIMES PER YEAR
DO YOU BELONG TO ANY CLUBS OR ORGANIZATIONS SUCH AS CHURCH GROUPS UNIONS, FRATERNAL OR ATHLETIC GROUPS, OR SCHOOL GROUPS?: NO
HOW OFTEN DO YOU ATTEND CHURCH OR RELIGIOUS SERVICES?: MORE THAN 4 TIMES PER YEAR
IN A TYPICAL WEEK, HOW MANY TIMES DO YOU TALK ON THE PHONE WITH FAMILY, FRIENDS, OR NEIGHBORS?: TWICE A WEEK
DO YOU BELONG TO ANY CLUBS OR ORGANIZATIONS SUCH AS CHURCH GROUPS UNIONS, FRATERNAL OR ATHLETIC GROUPS, OR SCHOOL GROUPS?: NO
HOW OFTEN DO YOU GET TOGETHER WITH FRIENDS OR RELATIVES?: ONCE A WEEK

## 2025-02-12 ASSESSMENT — LIFESTYLE VARIABLES
HOW OFTEN DO YOU HAVE A DRINK CONTAINING ALCOHOL: MONTHLY OR LESS
SKIP TO QUESTIONS 9-10: 1
AUDIT-C TOTAL SCORE: 1
HOW OFTEN DO YOU HAVE SIX OR MORE DRINKS ON ONE OCCASION: NEVER
HOW MANY STANDARD DRINKS CONTAINING ALCOHOL DO YOU HAVE ON A TYPICAL DAY: 1 OR 2

## 2025-02-13 ENCOUNTER — APPOINTMENT (OUTPATIENT)
Dept: MEDICAL GROUP | Facility: PHYSICIAN GROUP | Age: 60
End: 2025-02-13
Payer: COMMERCIAL

## 2025-02-13 VITALS
WEIGHT: 203 LBS | SYSTOLIC BLOOD PRESSURE: 132 MMHG | OXYGEN SATURATION: 98 % | DIASTOLIC BLOOD PRESSURE: 80 MMHG | RESPIRATION RATE: 16 BRPM | HEIGHT: 66 IN | BODY MASS INDEX: 32.62 KG/M2 | TEMPERATURE: 97.2 F | HEART RATE: 74 BPM

## 2025-02-13 DIAGNOSIS — Z11.59 NEED FOR HEPATITIS C SCREENING TEST: ICD-10-CM

## 2025-02-13 DIAGNOSIS — E66.3 OVERWEIGHT: ICD-10-CM

## 2025-02-13 DIAGNOSIS — Z11.3 SCREENING EXAMINATION FOR SEXUALLY TRANSMITTED DISEASE: ICD-10-CM

## 2025-02-13 DIAGNOSIS — E05.00 GRAVES DISEASE: ICD-10-CM

## 2025-02-13 DIAGNOSIS — Z80.0 FAMILY HISTORY OF COLON CANCER: ICD-10-CM

## 2025-02-13 DIAGNOSIS — E05.90 HYPERTHYROIDISM: ICD-10-CM

## 2025-02-13 DIAGNOSIS — E55.9 VITAMIN D DEFICIENCY: ICD-10-CM

## 2025-02-13 DIAGNOSIS — E78.5 DYSLIPIDEMIA: ICD-10-CM

## 2025-02-13 DIAGNOSIS — Z12.31 ENCOUNTER FOR SCREENING MAMMOGRAM FOR MALIGNANT NEOPLASM OF BREAST: ICD-10-CM

## 2025-02-13 DIAGNOSIS — J45.20 MILD INTERMITTENT ASTHMA WITHOUT COMPLICATION: ICD-10-CM

## 2025-02-13 PROBLEM — N95.1 MENOPAUSAL STATE: Status: RESOLVED | Noted: 2021-02-23 | Resolved: 2025-02-13

## 2025-02-13 PROBLEM — M21.41 BILATERAL PES PLANUS: Status: RESOLVED | Noted: 2018-06-08 | Resolved: 2025-02-13

## 2025-02-13 PROBLEM — D72.819 WBC DECREASED: Status: RESOLVED | Noted: 2021-03-05 | Resolved: 2025-02-13

## 2025-02-13 PROBLEM — M62.461 GASTROCNEMIUS EQUINUS, RIGHT: Status: RESOLVED | Noted: 2018-06-08 | Resolved: 2025-02-13

## 2025-02-13 PROBLEM — R52 TENDERNESS: Status: RESOLVED | Noted: 2020-06-26 | Resolved: 2025-02-13

## 2025-02-13 PROBLEM — R51.9 HEADACHE: Status: RESOLVED | Noted: 2020-01-31 | Resolved: 2025-02-13

## 2025-02-13 PROBLEM — L98.9 FACIAL LESION: Status: RESOLVED | Noted: 2021-10-11 | Resolved: 2025-02-13

## 2025-02-13 PROBLEM — M62.462 GASTROCNEMIUS EQUINUS OF LEFT LOWER EXTREMITY: Status: RESOLVED | Noted: 2018-06-08 | Resolved: 2025-02-13

## 2025-02-13 PROBLEM — M79.671 FOOT PAIN, RIGHT: Status: RESOLVED | Noted: 2017-03-21 | Resolved: 2025-02-13

## 2025-02-13 PROBLEM — M21.42 BILATERAL PES PLANUS: Status: RESOLVED | Noted: 2018-06-08 | Resolved: 2025-02-13

## 2025-02-13 PROCEDURE — 3079F DIAST BP 80-89 MM HG: CPT | Performed by: PHYSICIAN ASSISTANT

## 2025-02-13 PROCEDURE — 3075F SYST BP GE 130 - 139MM HG: CPT | Performed by: PHYSICIAN ASSISTANT

## 2025-02-13 PROCEDURE — 99215 OFFICE O/P EST HI 40 MIN: CPT | Performed by: PHYSICIAN ASSISTANT

## 2025-02-13 ASSESSMENT — ENCOUNTER SYMPTOMS
WEIGHT LOSS: 0
WEAKNESS: 0
MYALGIAS: 0
HEADACHES: 0
FALLS: 0
BRUISES/BLEEDS EASILY: 0
BLURRED VISION: 0
SORE THROAT: 0
SHORTNESS OF BREATH: 0
DIAPHORESIS: 0
NERVOUS/ANXIOUS: 0
NAUSEA: 0
DIARRHEA: 0
VOMITING: 0
COUGH: 0
FEVER: 0
PALPITATIONS: 0
DEPRESSION: 0
CHILLS: 0
DIZZINESS: 0
ORTHOPNEA: 0
ABDOMINAL PAIN: 0

## 2025-02-13 ASSESSMENT — PATIENT HEALTH QUESTIONNAIRE - PHQ9: CLINICAL INTERPRETATION OF PHQ2 SCORE: 0

## 2025-02-13 ASSESSMENT — FIBROSIS 4 INDEX: FIB4 SCORE: 1.14

## 2025-02-13 NOTE — PROGRESS NOTES
SUBJECTIVE:     CC: Establish care; prior Dr. Antonio    HPI:   Sheron presents today with the following:    ASSESSMENT & PLAN by Problem:     Problem   Overweight    Chronic, uncontrolled.  Restarting semaglutide.    [facility]  0.25mg semaglutide, 2.5ml vial, one vial.    Reemphasized importance of using this long-term, for at least one year.     Family History of Colon Cancer    colonoscopy 4/11/2024, [facility]. Recall 5 years, brother with colon cancer.     Graves Disease    Chronic, ongoing.  Was managed by endocrinology.  No longer on medication.  Will continue monitoring through primary care.     Hyperthyroidism    Chronic, ongoing.  Was managed by endocrinology.  No longer on medication.  Will continue monitoring through primary care.     Dyslipidemia    Chronic, uncontrolled.  Increase plant-based foods, decrease animal-based foods.  Not medicated  The 10-year ASCVD risk score (Silvano DK, et al., 2019) is: 2.6%       Mild Intermittent Asthma Without Complication    Chronic, stable.  Rarely needs albuterol.  Only with exertion, illness, fire season.     Facial Lesion (Resolved)   Wbc Decreased (Resolved)   Menopausal State (Resolved)   Tenderness (Resolved)   Headache (Resolved)   Bilateral Pes Planus (Resolved)   Gastrocnemius Equinus of Left Lower Extremity (Resolved)   Gastrocnemius Equinus, Right (Resolved)   Foot Pain, Right (Resolved)       Influenza: declines  Pneumococcal vaccine: declines    Colon cancer screen: colonoscopy 4/11/2024, [facility]. Recall 5 years, brother with colon cancer.  Records will be requested.    PAP: 1/14/2025, [facility] [place]. Will request records.     Mammogram: Ordered today.  Normal, 11/22/2023.      Repeat labs fall 2025.      Return if symptoms worsen or fail to improve, for lab discussion.      HPI:     Problem List Items Addressed This Visit       Dyslipidemia    Chronic, uncontrolled.     Latest Labs:   Lab Results   Component Value Date/Time    CHOLSTRLTOT 220 (H)  09/14/2023 08:55 AM     (H) 09/14/2023 08:55 AM    HDL 47 09/14/2023 08:55 AM    TRIGLYCERIDE 265 (H) 09/14/2023 08:55 AM        Medications: none    Risk calculator: The 10-year ASCVD risk score (Silvano QUINONEZ, et al., 2019) is: 2.6%            Relevant Orders    Lipid Profile    Family history of colon cancer    Graves disease      Endocrinology evaluation 10/1/2024:  Hyperthyroidism  Stable  We discussed remission of her Graves'  Overall she is has stable remission  There is no evidence of recurrence biochemically  She should notify me if she has recurrence of symptoms of hyperthyroidism or Graves' disease which includes unexplained weight loss, tremors, palpitations, heat intolerance and anxiety  Follow-up with primary care     Graves disease  This is the etiology of her hyperthyroidism is proven by her elevated thyroid autoantibodies for Graves' disease  Her Graves' disease is now in remission    High risk medication use  Patient was taking methimazole which is high risk medication  Rarely methimazole can cause leukopenia and liver enzyme abnormalities  We will continue to monitor her CBC and CMP periodically while she is on the medication          Hyperthyroidism      Endocrinology evaluation 10/1/2024:  Hyperthyroidism  Stable  We discussed remission of her Graves'  Overall she is has stable remission  There is no evidence of recurrence biochemically  She should notify me if she has recurrence of symptoms of hyperthyroidism or Graves' disease which includes unexplained weight loss, tremors, palpitations, heat intolerance and anxiety  Follow-up with primary care     Graves disease  This is the etiology of her hyperthyroidism is proven by her elevated thyroid autoantibodies for Graves' disease  Her Graves' disease is now in remission    High risk medication use  Patient was taking methimazole which is high risk medication  Rarely methimazole can cause leukopenia and liver enzyme abnormalities  We will  continue to monitor her CBC and CMP periodically while she is on the medication          Mild intermittent asthma without complication    Overweight    Chronic, uncontrolled.  Was on zepbound for about 2 months, lost 20# (April 2024).  Would like to go back on an injectable.  Had a long discussion with the patient regarding the plate method, tracking apps, and extended use of a GLP-1.    2/2025: 203#  11/2024: 197#  2/28/2024: 198#      Endocrinology evaluation 10/1/2024:  Improved  She is obese with BMI > 30  She tried and failed  6 months of a comprehensive weight loss program  She had a negative endocrine work up for obesity  She tried and failed phentermine  Wegovy was not covered by insurance  She was on Zepbound she would like to try it again I am going to have her contact our nurse practitioner for compounded Zepbound therapy         Relevant Orders    CBC WITH DIFFERENTIAL    Comp Metabolic Panel    TSH     Other Visit Diagnoses         Encounter for screening mammogram for malignant neoplasm of breast        Relevant Orders    MA-SCREENING MAMMO BILAT W/TOMOSYNTHESIS W/CAD      Screening examination for sexually transmitted disease        Relevant Orders    HIV AG/AB COMBO ASSAY SCREENING      Need for hepatitis C screening test        Relevant Orders    HEP C VIRUS ANTIBODY      Vitamin D deficiency        Relevant Orders    VITAMIN D,25 HYDROXY (DEFICIENCY)                   ROS:  Review of Systems   Constitutional:  Negative for chills, diaphoresis, fever, malaise/fatigue and weight loss.   HENT:  Negative for hearing loss and sore throat.    Eyes:  Negative for blurred vision.   Respiratory:  Negative for cough and shortness of breath.    Cardiovascular:  Negative for chest pain, palpitations, orthopnea and leg swelling.   Gastrointestinal:  Negative for abdominal pain, diarrhea, nausea and vomiting.   Genitourinary:  Negative for dysuria, frequency, hematuria and urgency.   Musculoskeletal:  Negative  "for falls, joint pain and myalgias.   Skin:  Negative for rash.   Neurological:  Negative for dizziness, weakness and headaches.   Endo/Heme/Allergies:  Does not bruise/bleed easily.   Psychiatric/Behavioral:  Negative for depression. The patient is not nervous/anxious.        OBJECTIVE:     Exam:  /80 (BP Location: Left arm, Patient Position: Sitting, BP Cuff Size: Adult)   Pulse 74   Temp 36.2 °C (97.2 °F) (Temporal)   Resp 16   Ht 1.676 m (5' 6\")   Wt 92.1 kg (203 lb)   SpO2 98%   BMI 32.77 kg/m²  Body mass index is 32.77 kg/m².    Physical Exam  Vitals reviewed.   Constitutional:       General: She is not in acute distress.     Appearance: Normal appearance.   HENT:      Head: Normocephalic and atraumatic.   Eyes:      Conjunctiva/sclera: Conjunctivae normal.   Neck:      Comments: No thyromegaly noted.  Cardiovascular:      Rate and Rhythm: Normal rate and regular rhythm.      Heart sounds: Normal heart sounds.   Pulmonary:      Effort: Pulmonary effort is normal.      Breath sounds: Normal breath sounds.   Abdominal:      General: Abdomen is flat. Bowel sounds are normal. There is no distension.      Palpations: Abdomen is soft. There is no mass.      Tenderness: There is no abdominal tenderness. There is no guarding.   Musculoskeletal:      Cervical back: Normal range of motion and neck supple.   Lymphadenopathy:      Cervical: No cervical adenopathy.   Skin:     General: Skin is warm and dry.   Neurological:      General: No focal deficit present.      Mental Status: She is alert and oriented to person, place, and time.   Psychiatric:         Mood and Affect: Mood normal.         Behavior: Behavior normal.         Judgment: Judgment normal.           CHART REVIEW:     Labs:                Time: 43 minutes in total spent on patient care including pre-charting, record review, documentation and, at times, discussion with healthcare staff.  This includes face-to-face time for exam, review, " discussion, as well as counseling and coordinating care.     Please note that this dictation was created using voice recognition software. I have made every reasonable attempt to correct obvious errors, but I expect that there are errors of grammar and possibly content that I did not discover before finalizing the note.

## 2025-02-13 NOTE — LETTER
[facility]  Christine Bernabe P.A.-C.  [address]  [facility] [address]  Fax: [phone]   Authorization for Release/Disclosure of   Protected Health Information   Name: SHERON ESCOBAR : 1965 SSN: [id number]   Address: [address] [place] [address] [place] [address] Phone:    [phone] (home)    I authorize the entity listed below to release/disclose the PHI below to:   [facility]/Christine Bernabe P.A.-C. and Christine Bernabe P.A.-C.   Provider or Entity Name:  [facility]   Address   City, State, [facility]   Phone:      Fax:     Reason for request: continuity of care   Information to be released:    [  ] LAST COLONOSCOPY,  including any PATH REPORT and follow-up  [  ] LAST FIT/COLOGUARD RESULT [  ] LAST DEXA  [  ] LAST MAMMOGRAM  [xxx  ] LAST PAP  [  ] LAST LABS [  ] RETINA EXAM REPORT  [  ] IMMUNIZATION RECORDS  [  ] Release all info      [  ] Check here and initial the line next to each item to release ALL health information INCLUDING  _____ Care and treatment for drug and / or alcohol abuse  _____ HIV testing, infection status, or AIDS  _____ Genetic Testing    DATES OF SERVICE OR TIME PERIOD TO BE DISCLOSED: _____________  I understand and acknowledge that:  * This Authorization may be revoked at any time by you in writing, except if your health information has already been used or disclosed.  * Your health information that will be used or disclosed as a result of you signing this authorization could be re-disclosed by the recipient. If this occurs, your re-disclosed health information may no longer be protected by State or Federal laws.  * You may refuse to sign this Authorization. Your refusal will not affect your ability to obtain treatment.  * This Authorization becomes effective upon signing and will  on (date) __________.      If no date is indicated, this Authorization will  one (1) year from the signature date.    Name: Sheron Escobar  Signature: Date:    2/13/2025     PLEASE FAX REQUESTED RECORDS BACK TO: [phone]

## 2025-02-13 NOTE — ASSESSMENT & PLAN NOTE
Chronic, uncontrolled.  Was on zepbound for about 2 months, lost 20# (April 2024).  Would like to go back on an injectable.  Had a long discussion with the patient regarding the plate method, tracking apps, and extended use of a GLP-1.    2/2025: 203#  11/2024: 197#  2/28/2024: 198#      Endocrinology evaluation 10/1/2024:  Improved  She is obese with BMI > 30  She tried and failed  6 months of a comprehensive weight loss program  She had a negative endocrine work up for obesity  She tried and failed phentermine  Wegovy was not covered by insurance  She was on Zepbound she would like to try it again I am going to have her contact our nurse practitioner for compounded Zepbound therapy

## 2025-02-13 NOTE — ASSESSMENT & PLAN NOTE
Chronic, uncontrolled.     Latest Labs:   Lab Results   Component Value Date/Time    CHOLSTRLTOT 220 (H) 09/14/2023 08:55 AM     (H) 09/14/2023 08:55 AM    HDL 47 09/14/2023 08:55 AM    TRIGLYCERIDE 265 (H) 09/14/2023 08:55 AM        Medications: none    Risk calculator: The 10-year ASCVD risk score (Silvano QUINONEZ, et al., 2019) is: 2.6%

## 2025-02-13 NOTE — LETTER
[facility]  Christine Bernabe P.A.-C.  [address]  [facility] [address]  Fax: [phone]   Authorization for Release/Disclosure of   Protected Health Information   Name: ARAM ESCOBAR : 1965 SSN: [id number]   Address: [address] [place] [address] [place] [address] Phone:    [phone] (home)    I authorize the entity listed below to release/disclose the PHI below to:   [facility]/Christine Bernabe P.A.-C. and Christine Bernabe P.A.-C.   Provider or Entity Name:  [facility]   Address   City, [facility], Zip:               [address] [place] [address] [place] [address]   Phone:  [phone]      Fax:      [phone]        Reason for request: continuity of care   Information to be released:    [ X ] LAST COLONOSCOPY,  including any PATH REPORT and follow-up  [ X ] LAST FIT/COLOGUARD RESULT [  ] LAST DEXA  [  ] LAST MAMMOGRAM  [  ] LAST PAP  [  ] LAST LABS [  ] RETINA EXAM REPORT  [  ] IMMUNIZATION RECORDS  [  ] Release all info      [  ] Check here and initial the line next to each item to release ALL health information INCLUDING  _____ Care and treatment for drug and / or alcohol abuse  _____ HIV testing, infection status, or AIDS  _____ Genetic Testing    DATES OF SERVICE OR TIME PERIOD TO BE DISCLOSED: _____________  I understand and acknowledge that:  * This Authorization may be revoked at any time by you in writing, except if your health information has already been used or disclosed.  * Your health information that will be used or disclosed as a result of you signing this authorization could be re-disclosed by the recipient. If this occurs, your re-disclosed health information may no longer be protected by State or Federal laws.  * You may refuse to sign this Authorization. Your refusal will not affect your ability to obtain treatment.  * This Authorization becomes effective upon signing and will  on (date) __________.      If no date is indicated, this Authorization will   one (1) year from the signature date.    Name: Sheron Le    Signature:   Date:     2025       PLEASE FAX REQUESTED RECORDS BACK TO: [phone]

## 2025-02-13 NOTE — ASSESSMENT & PLAN NOTE
Endocrinology evaluation 10/1/2024:  Hyperthyroidism  Stable  We discussed remission of her Graves'  Overall she is has stable remission  There is no evidence of recurrence biochemically  She should notify me if she has recurrence of symptoms of hyperthyroidism or Graves' disease which includes unexplained weight loss, tremors, palpitations, heat intolerance and anxiety  Follow-up with primary care     Graves disease  This is the etiology of her hyperthyroidism is proven by her elevated thyroid autoantibodies for Graves' disease  Her Graves' disease is now in remission    High risk medication use  Patient was taking methimazole which is high risk medication  Rarely methimazole can cause leukopenia and liver enzyme abnormalities  We will continue to monitor her CBC and CMP periodically while she is on the medication

## 2025-03-14 NOTE — PROGRESS NOTES
Subjective:     CC: Establish care    HISTORY OF THE PRESENT ILLNESS: Patient is a 56 y.o. female. This pleasant patient is here today to establish care and discuss the following issues:    The patient reports getting approximately 2 headaches per month since December.  She states that she will feeling all over her head throbbing with associated vomiting.  She also has light sensitivity.  She will also experience milder headaches that are not associated with nausea and vomiting or light sensitivity.  The headaches did start after she started methimazole.  She recently decreased her methimazole dose from 5 mg to 2.5 mg and has noticed that the headaches are more mild.  Per her endocrinologist, he does not feel the headaches are associated with the methimazole.  Brain MRI on 12/27/2021 showed probable minimal chronic microvascular ischemic changes and chronic headache syndrome.      Allergies: Aspirin    Current Outpatient Medications Ordered in Epic   Medication Sig Dispense Refill   • Cholecalciferol (VITAMIN D3) 125 MCG (5000 UT) Tab Take  by mouth.     • rizatriptan (MAXALT) 5 MG tablet Take 1 Tablet by mouth one time as needed for Migraine for up to 1 dose. May repeat dose once, one hour after initial dose. 10 Tablet 3   • methimazole (TAPAZOLE) 5 MG Tab TAKE 1 TABLET BY MOUTH EVERY DAY 90 Tablet 1   • Multiple Vitamins-Minerals (ZINC PO) Take 2 Tablets by mouth every day.     • Ascorbic Acid (VITAMIN C PO) Take 1 Tablet by mouth every day.       No current [facility]-ordered facility-administered medications on file.       History reviewed. No pertinent past medical history.    Past Surgical History:   Procedure Laterality Date   • OVARIAN CYSTECTOMY  1993       Social History     Tobacco Use   • Smoking status: Never Smoker   • Smokeless tobacco: Never Used   Vaping Use   • Vaping Use: Never used   Substance Use Topics   • Alcohol use: Not Currently     Comment: 1-2 drinks per month   • Drug use: Never       Social  "History     Social History Narrative   • Not on file       Family History   Problem Relation Age of Onset   • Cancer Mother         uterine   • Heart Disease Mother    • Cancer Father         lymphoma   • Diabetes Maternal great-grandmother        Health Maintenance: Completed    ROS:   Gen: no fevers/chills  Pulm: no sob, no cough  CV: no chest pain, no palpitations  GI: no nausea/vomiting, no diarrhea  Neuro: no headaches, no numbness/tingling      Objective:     Exam: /82   Pulse 74   Temp 37.1 °C (98.7 °F)   Resp 14   Ht 1.676 m (5' 6\")   Wt 84.8 kg (187 lb)   SpO2 96%  Body mass index is 30.18 kg/m².    General: Normal appearing. No distress.  HEENT: Normocephalic. Eyes conjunctiva clear lids without ptosis, pupils equal and reactive to light accommodation, oropharynx is without erythema, edema or exudates.   Pulmonary: Clear to ausculation.  Normal effort. No rales, ronchi, or wheezing.  Cardiovascular: Regular rate and rhythm without murmur.   Abdomen: Soft, nontender, nondistended.   Musculoskeletal: No extremity cyanosis, clubbing, or edema.  Psych: Normal mood and affect. Alert and oriented x3. Judgment and insight is normal.    Assessment & Plan:   56 y.o. female with the following -    Acute non intractable tension-type headache  This is a chronic condition.  Recurrent.  The patient reports getting approximately 2 headaches per month since December.  She states that she will feeling all over her head throbbing with associated vomiting.  She also has light sensitivity.  She will also experience milder headaches that are not associated with nausea and vomiting or light sensitivity.  The headaches did start after she started methimazole.  She recently decreased her methimazole dose from 5 mg to 2.5 mg and has noticed that the headaches are more mild.  Per her endocrinologist, he does not feel the headaches are associated with the methimazole.  Brain MRI on 12/27/2021 showed probable minimal " chronic microvascular ischemic changes and chronic headache syndrome.  -Trial of rizatriptan 5 to 10 mg as needed for acute headaches  - rizatriptan (MAXALT) 5 MG tablet; Take 1 Tablet by mouth one time as needed for Migraine for up to 1 dose. May repeat dose once, one hour after initial dose.  Dispense: 10 Tablet; Refill: 3    Graves disease  Hyperthyroidism  This is a chronic medical condition.  Ongoing.  The patient is followed by endocrinology, Yi Floyd.  The patient is on methimazole 5 mg daily.  Thyroid uptake scan on 5/17/2021 showed increased 5-hour uptake of 46.8% with a diffuse homogenous pattern of uptake throughout the thyroid gland.  Labs from 11/17/2021 showed a normal TSH of 4.27 with a normal free T4 of 0.95 and a normal free T3 of 2.72.  -Continue methimazole 2.5 mg daily    Mixed hyperlipidemia  This is a chronic medical condition.  Ongoing.  Lipid panel from 3/2/2021 showed a total cholesterol 235, , HDL 56, triglycerides 228. The 10-year ASCVD risk score ([place] DC Jr., et al., 2013) is: 2.2%  -Continue dietary management with a low-cholesterol diet given the patient's low 10-year ASCVD risk or  - Comp Metabolic Panel; Future  - Lipid Profile; Future    Screening for deficiency anemia  - CBC WITH DIFFERENTIAL; Future    Return in about 1 year (around 1/12/2023) for Annual/Wellness Visit.    Please note that this dictation was created using voice recognition software. I have made every reasonable attempt to correct obvious errors, but I expect that there are errors of grammar and possibly content that I did not discover before finalizing the note.   right side of head pain

## 2025-03-31 ENCOUNTER — HOSPITAL ENCOUNTER (OUTPATIENT)
Dept: RADIOLOGY | Facility: MEDICAL CENTER | Age: 60
End: 2025-03-31
Attending: PHYSICIAN ASSISTANT
Payer: COMMERCIAL

## 2025-03-31 DIAGNOSIS — Z12.31 ENCOUNTER FOR SCREENING MAMMOGRAM FOR MALIGNANT NEOPLASM OF BREAST: ICD-10-CM

## 2025-03-31 PROCEDURE — 77067 SCR MAMMO BI INCL CAD: CPT

## 2025-04-07 ENCOUNTER — RESULTS FOLLOW-UP (OUTPATIENT)
Dept: MEDICAL GROUP | Facility: PHYSICIAN GROUP | Age: 60
End: 2025-04-07

## 2025-04-29 ENCOUNTER — APPOINTMENT (OUTPATIENT)
Dept: MEDICAL GROUP | Facility: PHYSICIAN GROUP | Age: 60
End: 2025-04-29
Payer: COMMERCIAL

## 2025-04-30 ENCOUNTER — OFFICE VISIT (OUTPATIENT)
Dept: MEDICAL GROUP | Facility: PHYSICIAN GROUP | Age: 60
End: 2025-04-30
Payer: COMMERCIAL

## 2025-04-30 VITALS
WEIGHT: 200.8 LBS | HEIGHT: 66 IN | SYSTOLIC BLOOD PRESSURE: 140 MMHG | HEART RATE: 73 BPM | BODY MASS INDEX: 32.27 KG/M2 | TEMPERATURE: 98.1 F | DIASTOLIC BLOOD PRESSURE: 80 MMHG | RESPIRATION RATE: 16 BRPM | OXYGEN SATURATION: 98 %

## 2025-04-30 DIAGNOSIS — E78.5 DYSLIPIDEMIA: ICD-10-CM

## 2025-04-30 DIAGNOSIS — R03.0 ELEVATED BLOOD PRESSURE READING: ICD-10-CM

## 2025-04-30 ASSESSMENT — ENCOUNTER SYMPTOMS
SHORTNESS OF BREATH: 0
CHILLS: 0
FEVER: 0

## 2025-04-30 ASSESSMENT — FIBROSIS 4 INDEX: FIB4 SCORE: 1.16

## 2025-04-30 NOTE — PROGRESS NOTES
"SUBJECTIVE:     CC: blood pressure/cholesterol follow up     HPI:   Sheron presents today with the following:    ASSESSMENT & PLAN by Problem:     Problem   Elevated Blood Pressure Reading    Chronic, uncontrolled.  Has been running high the last month.  Discussed lifestyle changes.  Will follow up early fall 2025 for reevaluation.     Dyslipidemia    Chronic, uncontrolled.  Increase plant-based foods, decrease animal-based foods.  Not medicated  The 10-year ASCVD risk score (Silvano QUINONEZ, et al., 2019) is: 2.6%  Working on lifestyle changes.  Will follow up early fall 2025 for reevaluation.           Colon cancer screen: colonoscopy 4/11/2024, [facility]. Recall 5 years, brother with colon cancer.  Records will be requested.       Follow up instructions from primary care evaluation 2/13/2025:  Repeat labs fall 2025.     Return in about 18 weeks (around 9/3/2025) for lab discussion, blood pressure.         HPI:     Problem List Items Addressed This Visit       Dyslipidemia    Elevated blood pressure reading              ROS:  Review of Systems   Constitutional:  Negative for chills and fever.   Respiratory:  Negative for shortness of breath.    Cardiovascular:  Negative for chest pain.       OBJECTIVE:     Exam:  BP (!) 140/80 (BP Location: Left arm, Patient Position: Sitting, BP Cuff Size: Adult)   Pulse 73   Temp 36.7 °C (98.1 °F) (Temporal)   Resp 16   Ht 1.676 m (5' 6\")   Wt 91.1 kg (200 lb 12.8 oz)   SpO2 98%   BMI 32.41 kg/m²  Body mass index is 32.41 kg/m².    Physical Exam  Vitals reviewed.   Constitutional:       General: She is not in acute distress.     Appearance: Normal appearance.   Pulmonary:      Effort: Pulmonary effort is normal.   Neurological:      General: No focal deficit present.      Mental Status: She is alert.   Psychiatric:         Mood and Affect: Mood normal.         Behavior: Behavior normal.         Judgment: Judgment normal.                    Please note that this dictation was created " using voice recognition software. I have made every reasonable attempt to correct obvious errors, but I expect that there are errors of grammar and possibly content that I did not discover before finalizing the note.